# Patient Record
Sex: FEMALE | Race: WHITE
[De-identification: names, ages, dates, MRNs, and addresses within clinical notes are randomized per-mention and may not be internally consistent; named-entity substitution may affect disease eponyms.]

---

## 2017-01-18 ENCOUNTER — HOSPITAL ENCOUNTER (EMERGENCY)
Dept: HOSPITAL 17 - PHED | Age: 77
Discharge: HOME | End: 2017-01-18
Payer: MEDICARE

## 2017-01-18 VITALS
HEART RATE: 78 BPM | OXYGEN SATURATION: 96 % | SYSTOLIC BLOOD PRESSURE: 157 MMHG | RESPIRATION RATE: 19 BRPM | TEMPERATURE: 98 F | DIASTOLIC BLOOD PRESSURE: 75 MMHG

## 2017-01-18 VITALS — BODY MASS INDEX: 23.32 KG/M2 | WEIGHT: 139.99 LBS | HEIGHT: 65 IN

## 2017-01-18 DIAGNOSIS — R21: Primary | ICD-10-CM

## 2017-01-18 DIAGNOSIS — I25.2: ICD-10-CM

## 2017-01-18 DIAGNOSIS — E78.00: ICD-10-CM

## 2017-01-18 DIAGNOSIS — F41.9: ICD-10-CM

## 2017-01-18 DIAGNOSIS — Z86.73: ICD-10-CM

## 2017-01-18 PROCEDURE — 99282 EMERGENCY DEPT VISIT SF MDM: CPT

## 2017-01-18 NOTE — PD
HPI


Chief Complaint:  Skin Problem


Time Seen by Provider:  08:40


Travel History


International Travel<30 days:  No


Contact w/Intl Traveler<30days:  No


Traveled to known affect area:  No





History of Present Illness


HPI


The patient was seen and examined in the presence of the nurse.  She complains 

of rash.  She's had a red rash on her arms primarily but also a bit on her legs 

for the last 2 months.  She seen her primary physician and her dermatologist 

for this and the rash is still present so she came to the ER.  She has some 

minor itching.  She's been placing hydrocortisone cream on it.  Her 

dermatologist told her it came from her pet





PFSH


Past Medical History


Hx Anticoagulant Therapy:  No


Anxiety:  Yes


Cardiovascular Problems:  Yes (2 MI'S)


High Cholesterol:  Yes


Cerebrovascular Accident:  Yes (CVA)


Diabetes:  No


Diminished Hearing:  No


Immunizations Current:  Yes


Menopausal:  Yes


Dilation and Curettage (D&C):  Yes





Past Surgical History


Hysterectomy:  Yes (Partial 1980.)


Tonsillectomy:  Yes


Other Surgery:  Yes (ANAL FISSURE)





Social History


Alcohol Use:  No


Tobacco Use:  No


Substance Use:  No





Allergies-Medications


(Allergen,Severity, Reaction):  


Coded Allergies:  


     Sulfa (Verified  Allergy, Severe, WAS A CHILD WHEN HAPPENED AND NOT SURE 

WHAT HAPPENED, 1/18/17)


Reported Meds & Prescriptions





Reported Meds & Active Scripts


Active


No Active Prescriptions or Reported Medications    








Review of Systems


General / Constitutional:  No: Fever


HENT:  No: Headaches


Cardiovascular:  No: Chest Pain or Discomfort





Physical Exam


Narrative


Psych: Normal mood and affect.  Normal insight and judgment.





SKIN: Inspection shows no ulcers.  Palpation shows no induration or nodules.  

She has macular erythema on the forearms and a bit on the shins.  No vesicle or 

raised lesions.





Data


Data


Last Documented VS





Vital Signs








  Date Time  Temp Pulse Resp B/P Pulse Ox O2 Delivery O2 Flow Rate FiO2


 


1/18/17 08:26 98.0 78 19 157/75 96   











MDM


Medical Decision Making


Medical Screen Exam Complete:  Yes


Emergency Medical Condition:  Yes


Medical Record Reviewed:  Yes


Differential Diagnosis


Allergic reaction, dermatitis, eczema


Narrative Course


I have reviewed the patient's electronic record





Recommending patient follow-up with her dermatologist who has already seen this 

rash and instituted treatment.


No emergent recommendations beyond that





Diagnosis





 Primary Impression:  


 Rash





***Additional Instructions:


Follow-up with dermatologist


***Med/Other Pt SpecificInfo:  Other


Scripts


No Active Prescriptions or Reported Meds


Disposition:  01 DISCHARGE HOME


Condition:  Rodger Pollard MD Jan 18, 2017 08:52

## 2017-04-23 ENCOUNTER — HOSPITAL ENCOUNTER (EMERGENCY)
Dept: HOSPITAL 17 - PHED | Age: 77
Discharge: HOME | End: 2017-04-23
Payer: MEDICARE

## 2017-04-23 VITALS
DIASTOLIC BLOOD PRESSURE: 50 MMHG | RESPIRATION RATE: 18 BRPM | SYSTOLIC BLOOD PRESSURE: 139 MMHG | OXYGEN SATURATION: 99 % | HEART RATE: 76 BPM

## 2017-04-23 VITALS
SYSTOLIC BLOOD PRESSURE: 181 MMHG | TEMPERATURE: 97.8 F | DIASTOLIC BLOOD PRESSURE: 91 MMHG | OXYGEN SATURATION: 93 % | HEART RATE: 80 BPM | RESPIRATION RATE: 14 BRPM

## 2017-04-23 DIAGNOSIS — R21: ICD-10-CM

## 2017-04-23 DIAGNOSIS — I25.2: ICD-10-CM

## 2017-04-23 DIAGNOSIS — Z86.73: ICD-10-CM

## 2017-04-23 DIAGNOSIS — E11.9: ICD-10-CM

## 2017-04-23 DIAGNOSIS — E78.00: ICD-10-CM

## 2017-04-23 DIAGNOSIS — E78.5: ICD-10-CM

## 2017-04-23 DIAGNOSIS — N39.0: Primary | ICD-10-CM

## 2017-04-23 LAB
ALP SERPL-CCNC: 185 U/L (ref 45–117)
ALT SERPL-CCNC: 28 U/L (ref 10–53)
ANION GAP SERPL CALC-SCNC: 9 MEQ/L (ref 5–15)
AST SERPL-CCNC: 19 U/L (ref 15–37)
BASOPHILS # BLD AUTO: 0.1 TH/MM3 (ref 0–0.2)
BASOPHILS NFR BLD: 0.7 % (ref 0–2)
BILIRUB SERPL-MCNC: 0.2 MG/DL (ref 0.2–1)
BUN SERPL-MCNC: 22 MG/DL (ref 7–18)
CHLORIDE SERPL-SCNC: 105 MEQ/L (ref 98–107)
COLOR UR: YELLOW
COMMENT (UR): (no result)
CULTURE IF INDICATED: (no result)
EOSINOPHIL # BLD: 0.2 TH/MM3 (ref 0–0.4)
EOSINOPHIL NFR BLD: 2 % (ref 0–4)
ERYTHROCYTE [DISTWIDTH] IN BLOOD BY AUTOMATED COUNT: 12.2 % (ref 11.6–17.2)
GFR SERPLBLD BASED ON 1.73 SQ M-ARVRAT: 44 ML/MIN (ref 89–?)
GLUCOSE UR STRIP-MCNC: (no result) MG/DL
HCO3 BLD-SCNC: 27.4 MEQ/L (ref 21–32)
HCT VFR BLD CALC: 39.3 % (ref 35–46)
HEMO FLAGS: (no result)
HGB UR QL STRIP: (no result)
HYALINE CASTS #/AREA URNS LPF: (no result) /LPF
KETONES UR STRIP-MCNC: (no result) MG/DL
LEUKOCYTE ESTERASE UR QL STRIP: (no result) /HPF (ref 0–5)
LYMPHOCYTES # BLD AUTO: 1.9 TH/MM3 (ref 1–4.8)
LYMPHOCYTES NFR BLD AUTO: 24 % (ref 9–44)
MCH RBC QN AUTO: 28.9 PG (ref 27–34)
MCHC RBC AUTO-ENTMCNC: 33.8 % (ref 32–36)
MCV RBC AUTO: 85.5 FL (ref 80–100)
METHOD OF COLLECTION: (no result)
MONOCYTES NFR BLD: 7.6 % (ref 0–8)
NEUTROPHILS # BLD AUTO: 5.3 TH/MM3 (ref 1.8–7.7)
NEUTROPHILS NFR BLD AUTO: 65.7 % (ref 16–70)
NITRITE UR QL STRIP: (no result)
PLATELET # BLD: 240 TH/MM3 (ref 150–450)
POTASSIUM SERPL-SCNC: 3.6 MEQ/L (ref 3.5–5.1)
RBC # BLD AUTO: 4.6 MIL/MM3 (ref 4–5.3)
SODIUM SERPL-SCNC: 141 MEQ/L (ref 136–145)
SP GR UR STRIP: 1.02 (ref 1–1.03)
SQUAMOUS #/AREA URNS HPF: (no result) /HPF (ref 0–5)
WBC # BLD AUTO: 8.1 TH/MM3 (ref 4–11)

## 2017-04-23 PROCEDURE — 87086 URINE CULTURE/COLONY COUNT: CPT

## 2017-04-23 PROCEDURE — 83605 ASSAY OF LACTIC ACID: CPT

## 2017-04-23 PROCEDURE — 87040 BLOOD CULTURE FOR BACTERIA: CPT

## 2017-04-23 PROCEDURE — 87186 SC STD MICRODIL/AGAR DIL: CPT

## 2017-04-23 PROCEDURE — 86403 PARTICLE AGGLUT ANTBDY SCRN: CPT

## 2017-04-23 PROCEDURE — 70450 CT HEAD/BRAIN W/O DYE: CPT

## 2017-04-23 PROCEDURE — 85025 COMPLETE CBC W/AUTO DIFF WBC: CPT

## 2017-04-23 PROCEDURE — 87077 CULTURE AEROBIC IDENTIFY: CPT

## 2017-04-23 PROCEDURE — 81001 URINALYSIS AUTO W/SCOPE: CPT

## 2017-04-23 PROCEDURE — 80053 COMPREHEN METABOLIC PANEL: CPT

## 2017-04-23 PROCEDURE — 82140 ASSAY OF AMMONIA: CPT

## 2017-04-23 NOTE — RADHPO
EXAM DATE/TIME:  04/23/2017 15:17 

 

[facility] COMPARISON:     

CT BRAIN W/O CONTRAST, July 05, 2016, 22:28.

 

 

INDICATIONS :     

Altered mental status.

                      

 

RADIATION DOSE:     

59.48 CTDIvol (mGy) 

 

 

 

MEDICAL HISTORY :     

Myocardial infarction. Cerebrovascular disease.  

 

SURGICAL HISTORY :      

Tonsillectomy. 

 

ENCOUNTER:      

Initial

 

ACUITY:      

1 day

 

PAIN SCALE:      

0/10

 

LOCATION:        

cranial 

 

TECHNIQUE:     

Multiple contiguous axial images were obtained of the head.  Using automated exposure control and adj
ustment of the mA and/or kV according to patient size, radiation dose was kept as low as reasonably a
chievable to obtain optimal diagnostic quality images. 

 

FINDINGS:     

 

CEREBRUM:     

The ventricles are normal for age.  No evidence of midline shift, mass lesion, hemorrhage or acute in
farction.  No extra-axial fluid collections are seen.

 

POSTERIOR FOSSA:     

The cerebellum and brainstem are intact.  The 4th ventricle is midline.  The cerebellopontine angle i
s unremarkable.

 

EXTRACRANIAL:     

The visualized portion of the orbits is intact.

 

SKULL:     

The calvaria is intact.  No evidence of skull fracture.

 

CONCLUSION:     

Age-appropriate atrophy.  No acute findings.

 

 

 

 Jong Sullivan MD on April 23, 2017 at 15:30           

Board Certified Radiologist.

 This report was verified electronically.

## 2017-04-23 NOTE — PD
HPI


Chief Complaint:  Altered Mental Status


Time Seen by Provider:  15:03


Travel History


International Travel<30 days:  No


Contact w/Intl Traveler<30days:  No





History of Present Illness


HPI


Patient presents accompanied by her daughter-in-law.  Patient states that she 

doesn't think she is given a make it through the night.  States things are 

shutting down.  States her speech has been shutting down since yesterday.  

Denies any nausea vomiting diarrhea or fever.  Denies any new chest pain 

shortness of breath urinary or bowel symptoms.  Reports a rash that feels like 

it's on fire.  Evaluated twice here in the emergency room and by dermatology 

and diagnosed as a contact dermatitis secondary to her pet.  She does live at 

home alone.  Significant workup for altered mental status approximately 10 

months ago that was negative.  Denies any medication changes.  Past medical 

history for vertigo, diabetes, hyperlipidemia and anxiety.  Currently on no 

medications.  Random blood sugar 191.





PFSH


Past Medical History


Hx Anticoagulant Therapy:  No


Anxiety:  Yes


Cardiovascular Problems:  Yes (2 MI'S)


High Cholesterol:  Yes


Cerebrovascular Accident:  Yes (CVA)


Diabetes:  No (diet control)


Diminished Hearing:  No


Immunizations Current:  Yes


Menopausal:  Yes


Dilation and Curettage (D&C):  Yes





Past Surgical History


Hysterectomy:  Yes (Partial 1980.)


Tonsillectomy:  Yes


Other Surgery:  Yes (ANAL FISSURE)





Social History


Alcohol Use:  No


Tobacco Use:  No


Substance Use:  No





Allergies-Medications


(Allergen,Severity, Reaction):  


Coded Allergies:  


     Sulfa (Verified  Allergy, Severe, WAS A CHILD WHEN HAPPENED AND NOT SURE 

WHAT HAPPENED, 4/23/17)


Reported Meds & Prescriptions





Reported Meds & Active Scripts


Active


No Active Prescriptions or Reported Medications    








Review of Systems


General / Constitutional:  No: Fever


Eyes:  No: Visual changes


HENT:  No: Headaches


Cardiovascular:  No: Chest Pain or Discomfort


Respiratory:  No: Shortness of Breath


Gastrointestinal:  No: Abdominal Pain


Genitourinary:  No: Dysuria


Musculoskeletal:  No: Pain


Skin:  No Rash


Neurologic:  No: Weakness


Psychiatric:  No: Depression


Endocrine:  No: Polydipsia


Hematologic/Lymphatic:  No: Easy Bruising





Physical Exam


Narrative


GENERAL: Well-nourished, well-developed patient.


SKIN: Focused skin assessment warm/dry.


HEAD: Normocephalic.


EYES: No scleral icterus. No injection or drainage. 


NECK: Supple, trachea midline. No JVD or lymphadenopathy.


CARDIOVASCULAR: Regular rate and rhythm without murmurs, gallops, or rubs. 


RESPIRATORY: Breath sounds equal bilaterally. No accessory muscle use.


GASTROINTESTINAL: Abdomen soft, non-tender, nondistended. 


MUSCULOSKELETAL: No cyanosis, or edema. 


BACK: Nontender without obvious deformity. No CVA tenderness.





Data


Data


Last Documented VS





Vital Signs








  Date Time  Temp Pulse Resp B/P Pulse Ox O2 Delivery O2 Flow Rate FiO2


 


4/23/17 15:16     93 Room Air  


 


4/23/17 15:16 97.8 80 14 181/91    








Orders





 Ammonia (4/23/17 15:03)


Complete Blood Count With Diff (4/23/17 15:03)


Comprehensive Metabolic Panel (4/23/17 15:03)


Lactic Acid Sepsis Protocol (4/23/17 15:03)


Urinalysis - C+S If Indicated (4/23/17 15:03)


Blood Culture (4/23/17 15:03)


Ct Brain W/O Iv Contrast(Rout) (4/23/17 15:03)


Blood Glucose (4/23/17 15:03)


Ecg Monitoring (4/23/17 15:03)


Iv Access Insert/Monitor (4/23/17 15:03)


Oximetry (4/23/17 15:03)


Sodium Chloride 0.9% Flush (Ns Flush) (4/23/17 15:15)





Labs





 Laboratory Tests








Test 4/23/17





 15:10


 


White Blood Count 8.1 TH/MM3


 


Red Blood Count 4.60 MIL/MM3


 


Hemoglobin 13.3 GM/DL


 


Hematocrit 39.3 %


 


Mean Corpuscular Volume 85.5 FL


 


Mean Corpuscular Hemoglobin 28.9 PG


 


Mean Corpuscular Hemoglobin 33.8 %





Concent 


 


Red Cell Distribution Width 12.2 %


 


Platelet Count 240 TH/MM3


 


Mean Platelet Volume 7.0 FL


 


Neutrophils (%) (Auto) 65.7 %


 


Lymphocytes (%) (Auto) 24.0 %


 


Monocytes (%) (Auto) 7.6 %


 


Eosinophils (%) (Auto) 2.0 %


 


Basophils (%) (Auto) 0.7 %


 


Neutrophils # (Auto) 5.3 TH/MM3


 


Lymphocytes # (Auto) 1.9 TH/MM3


 


Monocytes # (Auto) 0.6 TH/MM3


 


Eosinophils # (Auto) 0.2 TH/MM3


 


Basophils # (Auto) 0.1 TH/MM3


 


CBC Comment DIFF FINAL 


 


Differential Comment  


 


Sodium Level 141 MEQ/L


 


Potassium Level 3.6 MEQ/L


 


Chloride Level 105 MEQ/L


 


Carbon Dioxide Level 27.4 MEQ/L


 


Anion Gap 9 MEQ/L


 


Blood Urea Nitrogen 22 MG/DL


 


Creatinine 1.20 MG/DL


 


Estimat Glomerular Filtration 44 ML/MIN





Rate 


 


Random Glucose 169 MG/DL


 


Lactic Acid Level 1.1 mmol/L


 


Calcium Level 8.8 MG/DL


 


Total Bilirubin 0.2 MG/DL


 


Aspartate Amino Transf 19 U/L





(AST/SGOT) 


 


Alanine Aminotransferase 28 U/L





(ALT/SGPT) 


 


Alkaline Phosphatase 185 U/L


 


Ammonia 23 MCMOL/L


 


Total Protein 7.5 GM/DL


 


Albumin 3.7 GM/DL











MDM


Medical Decision Making


Medical Screen Exam Complete:  Yes


Emergency Medical Condition:  Yes


Differential Diagnosis


Failure to thrive, dementia, UTI, sepsis, fever, contact dermatitis


Narrative Course


Assessment and plan discussed with patient and daughter-in-law at bedside





[facility] Point of Care


Comment


Case discussed and care transferred to Dr Villafana


Scripts


No Active Prescriptions or Reported Meds








Ezio Redding MD Apr 23, 2017 15:09

## 2017-04-23 NOTE — PD
Physical Exam


Date Seen by Provider:  Apr 23, 2017


Time Seen by Provider:  16:26


Narrative


This 76-year-old female is complaining of feeling very tired.  She was seen 

initially by Dr. Redding.  She is currently on no medication.  She has not had 

fever or chills.  Extensive workup was ordered.  CT of the brain is negative.  

Her blood work is unremarkable.  Urinalysis does show equivocal evidence of UTI 

with trace leukocyte esterase and 6-8 white cells.  She will be treated for 

this as she is having somewhat vague symptoms that are not explained otherwise.





Data


Data


Last Documented VS





Vital Signs








  Date Time  Temp Pulse Resp B/P Pulse Ox O2 Delivery O2 Flow Rate FiO2


 


4/23/17 16:08  76 18 139/50 99 Room Air  


 


4/23/17 15:16 97.8       








Orders





 Ammonia (4/23/17 15:03)


Complete Blood Count With Diff (4/23/17 15:03)


Comprehensive Metabolic Panel (4/23/17 15:03)


Lactic Acid Sepsis Protocol (4/23/17 15:03)


Urinalysis - C+S If Indicated (4/23/17 15:03)


Blood Culture (4/23/17 15:03)


Ct Brain W/O Iv Contrast(Rout) (4/23/17 15:03)


Blood Glucose (4/23/17 15:03)


Ecg Monitoring (4/23/17 15:03)


Iv Access Insert/Monitor (4/23/17 15:03)


Oximetry (4/23/17 15:03)


Sodium Chloride 0.9% Flush (Ns Flush) (4/23/17 15:15)


Urine Culture (4/23/17 15:55)





Labs





 Laboratory Tests








Test 4/23/17 4/23/17





 15:10 15:55


 


White Blood Count 8.1 TH/MM3 


 


Red Blood Count 4.60 MIL/MM3 


 


Hemoglobin 13.3 GM/DL 


 


Hematocrit 39.3 % 


 


Mean Corpuscular Volume 85.5 FL 


 


Mean Corpuscular Hemoglobin 28.9 PG 


 


Mean Corpuscular Hemoglobin 33.8 % 





Concent  


 


Red Cell Distribution Width 12.2 % 


 


Platelet Count 240 TH/MM3 


 


Mean Platelet Volume 7.0 FL 


 


Neutrophils (%) (Auto) 65.7 % 


 


Lymphocytes (%) (Auto) 24.0 % 


 


Monocytes (%) (Auto) 7.6 % 


 


Eosinophils (%) (Auto) 2.0 % 


 


Basophils (%) (Auto) 0.7 % 


 


Neutrophils # (Auto) 5.3 TH/MM3 


 


Lymphocytes # (Auto) 1.9 TH/MM3 


 


Monocytes # (Auto) 0.6 TH/MM3 


 


Eosinophils # (Auto) 0.2 TH/MM3 


 


Basophils # (Auto) 0.1 TH/MM3 


 


CBC Comment DIFF FINAL  


 


Differential Comment   


 


Sodium Level 141 MEQ/L 


 


Potassium Level 3.6 MEQ/L 


 


Chloride Level 105 MEQ/L 


 


Carbon Dioxide Level 27.4 MEQ/L 


 


Anion Gap 9 MEQ/L 


 


Blood Urea Nitrogen 22 MG/DL 


 


Creatinine 1.20 MG/DL 


 


Estimat Glomerular Filtration 44 ML/MIN 





Rate  


 


Random Glucose 169 MG/DL 


 


Lactic Acid Level 1.1 mmol/L 


 


Calcium Level 8.8 MG/DL 


 


Total Bilirubin 0.2 MG/DL 


 


Aspartate Amino Transf 19 U/L 





(AST/SGOT)  


 


Alanine Aminotransferase 28 U/L 





(ALT/SGPT)  


 


Alkaline Phosphatase 185 U/L 


 


Ammonia 23 MCMOL/L 


 


Total Protein 7.5 GM/DL 


 


Albumin 3.7 GM/DL 


 


Urine Collection Type  CATH 


 


Urine Color  YELLOW 


 


Urine Turbidity  CLEAR 


 


Urine pH  5.0 


 


Urine Specific Gravity  1.023 


 


Urine Protein  NEG mg/dL


 


Urine Glucose (UA)  NEG mg/dL


 


Urine Ketones  NEG mg/dL


 


Urine Occult Blood  NEG 


 


Urine Nitrite  NEG 


 


Urine Bilirubin  NEG 


 


Urine Leukocyte Esterase  SMALL 


 


Urine WBC  6-8 /hpf


 


Urine Squamous Epithelial  0-5 /hpf





Cells  


 


Urine Amorphous Sediment  FEW 


 


Urine Hyaline Casts  0-2 /lpf


 


Microscopic Urinalysis Comment  CULTURE





  INDICATED











MDM


Medical Record Reviewed:  Yes


Supervised Visit with REZA:  No


Differential Diagnosis


Differential includes alleged right imbalance, UTI,


Narrative Course


Urine shows equivocal  UTI


Diagnosis





 Primary Impression:  


 UTI (urinary tract infection)


 Qualified Code:  N30.00 - Acute cystitis without hematuria


Scripts


Nitrofurantoin Monohydrate Macrocrystals (Macrobid)100 Mg Eyr504 Mg PO BID  7 

Days  Ref 0


   Prov:Zafar Jose MD         4/23/17


Disposition:  01 DISCHARGE HOME


Condition:  Stable








Zafar Jose MD Apr 23, 2017 16:38

## 2017-05-06 ENCOUNTER — HOSPITAL ENCOUNTER (EMERGENCY)
Dept: HOSPITAL 17 - PHED | Age: 77
Discharge: HOME | End: 2017-05-06
Payer: MEDICARE

## 2017-05-06 VITALS — DIASTOLIC BLOOD PRESSURE: 75 MMHG | SYSTOLIC BLOOD PRESSURE: 171 MMHG

## 2017-05-06 VITALS
SYSTOLIC BLOOD PRESSURE: 191 MMHG | OXYGEN SATURATION: 98 % | HEART RATE: 78 BPM | RESPIRATION RATE: 20 BRPM | DIASTOLIC BLOOD PRESSURE: 88 MMHG

## 2017-05-06 VITALS — BODY MASS INDEX: 23.14 KG/M2 | HEIGHT: 65 IN | WEIGHT: 138.89 LBS

## 2017-05-06 VITALS
OXYGEN SATURATION: 96 % | TEMPERATURE: 98 F | DIASTOLIC BLOOD PRESSURE: 90 MMHG | SYSTOLIC BLOOD PRESSURE: 159 MMHG | HEART RATE: 72 BPM | RESPIRATION RATE: 16 BRPM

## 2017-05-06 DIAGNOSIS — Z86.73: ICD-10-CM

## 2017-05-06 DIAGNOSIS — F41.9: ICD-10-CM

## 2017-05-06 DIAGNOSIS — I25.2: ICD-10-CM

## 2017-05-06 DIAGNOSIS — E78.00: ICD-10-CM

## 2017-05-06 DIAGNOSIS — R53.1: Primary | ICD-10-CM

## 2017-05-06 LAB
ALP SERPL-CCNC: 171 U/L (ref 45–117)
ALT SERPL-CCNC: 28 U/L (ref 10–53)
ANION GAP SERPL CALC-SCNC: 9 MEQ/L (ref 5–15)
AST SERPL-CCNC: 22 U/L (ref 15–37)
BACTERIA #/AREA URNS HPF: (no result) /HPF
BASOPHILS # BLD AUTO: 0.4 TH/MM3 (ref 0–0.2)
BASOPHILS NFR BLD: 4 % (ref 0–2)
BILIRUB SERPL-MCNC: 0.2 MG/DL (ref 0.2–1)
BUN SERPL-MCNC: 19 MG/DL (ref 7–18)
CHLORIDE SERPL-SCNC: 105 MEQ/L (ref 98–107)
COLOR UR: YELLOW
COMMENT (UR): (no result)
CULTURE IF INDICATED: (no result)
EOSINOPHIL # BLD: 0.3 TH/MM3 (ref 0–0.4)
EOSINOPHIL NFR BLD: 2.9 % (ref 0–4)
ERYTHROCYTE [DISTWIDTH] IN BLOOD BY AUTOMATED COUNT: 12.1 % (ref 11.6–17.2)
GFR SERPLBLD BASED ON 1.73 SQ M-ARVRAT: 56 ML/MIN (ref 89–?)
GLUCOSE UR STRIP-MCNC: (no result) MG/DL
HCO3 BLD-SCNC: 27.5 MEQ/L (ref 21–32)
HCT VFR BLD CALC: 37 % (ref 35–46)
HEMO FLAGS: (no result)
HGB UR QL STRIP: (no result)
KETONES UR STRIP-MCNC: (no result) MG/DL
LEUKOCYTE ESTERASE UR QL STRIP: (no result) /HPF (ref 0–5)
LYMPHOCYTES # BLD AUTO: 2.1 TH/MM3 (ref 1–4.8)
LYMPHOCYTES NFR BLD AUTO: 21.9 % (ref 9–44)
MCH RBC QN AUTO: 28.8 PG (ref 27–34)
MCHC RBC AUTO-ENTMCNC: 33.6 % (ref 32–36)
MCV RBC AUTO: 85.7 FL (ref 80–100)
MONOCYTES NFR BLD: 6.5 % (ref 0–8)
NEUTROPHILS # BLD AUTO: 6.3 TH/MM3 (ref 1.8–7.7)
NEUTROPHILS NFR BLD AUTO: 64.7 % (ref 16–70)
NITRITE UR QL STRIP: (no result)
PLATELET # BLD: 246 TH/MM3 (ref 150–450)
POTASSIUM SERPL-SCNC: 4 MEQ/L (ref 3.5–5.1)
RBC # BLD AUTO: 4.31 MIL/MM3 (ref 4–5.3)
RBC #/AREA URNS HPF: (no result) /HPF (ref 0–3)
SODIUM SERPL-SCNC: 141 MEQ/L (ref 136–145)
SP GR UR STRIP: 1.01 (ref 1–1.03)
SQUAMOUS #/AREA URNS HPF: (no result) /HPF (ref 0–5)
WBC # BLD AUTO: 9.7 TH/MM3 (ref 4–11)

## 2017-05-06 PROCEDURE — 84443 ASSAY THYROID STIM HORMONE: CPT

## 2017-05-06 PROCEDURE — 93005 ELECTROCARDIOGRAM TRACING: CPT

## 2017-05-06 PROCEDURE — 85025 COMPLETE CBC W/AUTO DIFF WBC: CPT

## 2017-05-06 PROCEDURE — 84484 ASSAY OF TROPONIN QUANT: CPT

## 2017-05-06 PROCEDURE — 80053 COMPREHEN METABOLIC PANEL: CPT

## 2017-05-06 PROCEDURE — 71020: CPT

## 2017-05-06 PROCEDURE — 81001 URINALYSIS AUTO W/SCOPE: CPT

## 2017-05-06 NOTE — EKG
Date Performed: 05/06/2017       Time Performed: 17:44:52

 

PTAGE:      76 years

 

EKG:      Sinus rhythm 

 

 Poor R wave progression - cannot rule out anteroseptal infarct Left ventricular hypertrophy Abnormal
 ECG

 

PREVIOUS TRACING       : 07/05/2016 21.55

 

DOCTOR:   Shanta Sanchez  Interpretating Date/Time  05/06/2017 21:35:58

## 2017-05-06 NOTE — PD
HPI


Chief Complaint:  General Weakness


Time Seen by Provider:  17:25


Travel History


International Travel<30 days:  No


Contact w/Intl Traveler<30days:  No


Traveled to known affect area:  No





History of Present Illness


HPI


Patient is a 76-year-old female who comes in with complaints of generalized 

weakness.  She says that she was at home and she fell asleep and she didn't 

take of breath so she woke up.  Patient was here about a week ago when she said 

that she felt like she "was not going to make it through the night."  At that 

time a complete workup was done and she was found to have some white blood 

cells in her urine.  She was discharged her Macrobid, which she says she has 

completed.  She has no specific complaints at this time.  She denies any pain.  

She denies any chest pain, shortness of breath, abdominal pain.  She has not 

had fever or chills.  She had breakfast with her son this morning.  She says 

that she has a lot of anxiety.





PFSH


Past Medical History


Hx Anticoagulant Therapy:  No


Anxiety:  Yes


Cardiovascular Problems:  Yes (2 MI'S)


High Cholesterol:  Yes


Cerebrovascular Accident:  Yes (CVA)


Diabetes:  Yes (diet control)


Patient Takes Glucophage:  No


Diminished Hearing:  No


Immunizations Current:  Yes


Pregnant?:  Not Pregnant


Menopausal:  Yes


Dilation and Curettage (D&C):  Yes





Past Surgical History


Hysterectomy:  Yes (Partial 1980.)


Tonsillectomy:  Yes


Other Surgery:  Yes (ANAL FISSURE)





Social History


Alcohol Use:  No


Tobacco Use:  No


Substance Use:  No





Allergies-Medications


(Allergen,Severity, Reaction):  


Coded Allergies:  


     Sulfa (Verified  Allergy, Severe, WAS A CHILD WHEN HAPPENED AND NOT SURE 

WHAT HAPPENED, 5/6/17)


Reported Meds & Prescriptions





Reported Meds & Active Scripts


Active


No Active Prescriptions or Reported Medications    








Review of Systems


Except as stated in HPI:  all other systems reviewed are Neg


General / Constitutional:  No: Fever, Chills


HENT:  No: Headaches, Lightheadedness


Cardiovascular:  No: Chest Pain or Discomfort


Respiratory:  No: Shortness of Breath


Gastrointestinal:  No: Nausea, Vomiting


Genitourinary:  No: Dysuria


Musculoskeletal:  No: Myalgias


Skin:  No Rash, No Change in Pigmentation


Neurologic:  No: Weakness, Dizziness


Psychiatric:  Positive: Anxiety





Physical Exam


Narrative


GENERAL: Awake and alert, in no acute distress.


SKIN: Focused skin assessment warm/dry.


HEAD: Atraumatic. Normocephalic. 


EYES: Pupils equal and round. No scleral icterus. 


ENT: Mucous membranes pink and moist.


NECK: Trachea midline. No JVD. 


CARDIOVASCULAR: Regular rate and rhythm.  No murmur appreciated.


RESPIRATORY: No accessory muscle use. Clear to auscultation. Breath sounds 

equal bilaterally. 


GASTROINTESTINAL: Abdomen soft, non-tender, nondistended. 


MUSCULOSKELETAL: No obvious deformities. No clubbing.  No cyanosis.  No edema. 


NEUROLOGICAL: Awake and alert. No obvious cranial nerve deficits.  Motor 

grossly within normal limits. Normal speech.


PSYCHIATRIC: Appropriate mood and affect; insight and judgment normal.





Data


Data


Last Documented VS





Vital Signs








  Date Time  Temp Pulse Resp B/P Pulse Ox O2 Delivery O2 Flow Rate FiO2


 


5/6/17 17:06 98.0 72 16 159/90 96   








Orders





 Complete Blood Count With Diff (5/6/17 17:42)


Comprehensive Metabolic Panel (5/6/17 17:42)


Thyroid Stimulating Hormone (5/6/17 17:42)


Electrocardiogram (5/6/17 )


Urinalysis - C+S If Indicated (5/6/17 17:42)


Troponin I (5/6/17 17:42)


Chest, Pa & Lat (5/6/17 )





Labs





 Laboratory Tests








Test 5/6/17 5/6/17





 18:15 19:00


 


White Blood Count 9.7 TH/MM3 


 


Red Blood Count 4.31 MIL/MM3 


 


Hemoglobin 12.4 GM/DL 


 


Hematocrit 37.0 % 


 


Mean Corpuscular Volume 85.7 FL 


 


Mean Corpuscular Hemoglobin 28.8 PG 


 


Mean Corpuscular Hemoglobin 33.6 % 





Concent  


 


Red Cell Distribution Width 12.1 % 


 


Platelet Count 246 TH/MM3 


 


Mean Platelet Volume 7.2 FL 


 


Neutrophils (%) (Auto) 64.7 % 


 


Lymphocytes (%) (Auto) 21.9 % 


 


Monocytes (%) (Auto) 6.5 % 


 


Eosinophils (%) (Auto) 2.9 % 


 


Basophils (%) (Auto) 4.0 % 


 


Neutrophils # (Auto) 6.3 TH/MM3 


 


Lymphocytes # (Auto) 2.1 TH/MM3 


 


Monocytes # (Auto) 0.6 TH/MM3 


 


Eosinophils # (Auto) 0.3 TH/MM3 


 


Basophils # (Auto) 0.4 TH/MM3 


 


CBC Comment DIFF FINAL  


 


Differential Comment   


 


Sodium Level 141 MEQ/L 


 


Potassium Level 4.0 MEQ/L 


 


Chloride Level 105 MEQ/L 


 


Carbon Dioxide Level 27.5 MEQ/L 


 


Anion Gap 9 MEQ/L 


 


Blood Urea Nitrogen 19 MG/DL 


 


Creatinine 0.97 MG/DL 


 


Estimat Glomerular Filtration 56 ML/MIN 





Rate  


 


Random Glucose 107 MG/DL 


 


Calcium Level 8.7 MG/DL 


 


Total Bilirubin 0.2 MG/DL 


 


Aspartate Amino Transf 22 U/L 





(AST/SGOT)  


 


Alanine Aminotransferase 28 U/L 





(ALT/SGPT)  


 


Alkaline Phosphatase 171 U/L 


 


Troponin I LESS THAN 0.02 





 NG/ML 


 


Total Protein 7.2 GM/DL 


 


Albumin 3.5 GM/DL 


 


Thyroid Stimulating Hormone 2.260 uIU/ML 





3rd Gen  


 


Urine Color  YELLOW 


 


Urine Turbidity  CLEAR 


 


Urine pH  5.5 


 


Urine Specific Gravity  1.009 


 


Urine Protein  NEG mg/dL


 


Urine Glucose (UA)  NEG mg/dL


 


Urine Ketones  NEG mg/dL


 


Urine Occult Blood  NEG 


 


Urine Nitrite  NEG 


 


Urine Bilirubin  NEG 


 


Urine Leukocyte Esterase  SMALL 


 


Urine RBC  0-2 /hpf


 


Urine WBC  6-8 /hpf


 


Urine Squamous Epithelial  0-5 /hpf





Cells  


 


Urine Bacteria  FEW /hpf


 


Microscopic Urinalysis Comment  CULT NOT





  INDICATED











MDM


Medical Decision Making


Medical Screen Exam Complete:  Yes


Emergency Medical Condition:  Yes


Medical Record Reviewed:  Yes


Differential Diagnosis


Anxiety versus electrolyte abnormality versus infection


Narrative Course


Patient is a 76-year-old female comes in with vague complaints of weakness.  

Exam shows no acute abnormalities.  IV established, labs sent.  Labs show no 

acute abnormalities, are similar to her labs when she was here a week and half 

ago.





I spoke with her son and daughter who say that she seems to very anxious when 

her male friend is not spending time with her.  They have noticed that she 

seems to do things in seeking attention.  She has 4 children who live in the 

area and help her.  They say that her house is clean though she does have a lot 

of objects in the house.  She seems to be feeding herself.  They are not 

concerned that she is unable to care for self at home.  She is alert and 

oriented times 4.  





I discussed with the son and daughter possible dementia versus anxiety versus 

attention seeking.  They're comfortable taking their mother home at this time.  

I advised him to follow-up with a primary care doctor.  I advised him to bring 

her back at any time for any worsening symptoms.





Diagnosis





 Primary Impression:  


 Weakness


Patient Instructions:  Anxiety (ED), General Instructions, Weakness (ED)





***Additional Instructions:


Follow up with a primary care doctor.  Return to the ED as needed for any 

worsening symptoms.


Scripts


No Active Prescriptions or Reported Meds


Disposition:  01 DISCHARGE HOME


Condition:  Stable








Aym Estrada MD May 6, 2017 19:33

## 2017-10-10 ENCOUNTER — HOSPITAL ENCOUNTER (EMERGENCY)
Dept: HOSPITAL 17 - PHED | Age: 77
Discharge: HOME | End: 2017-10-10
Payer: MEDICARE

## 2017-10-10 VITALS
OXYGEN SATURATION: 97 % | RESPIRATION RATE: 16 BRPM | DIASTOLIC BLOOD PRESSURE: 79 MMHG | SYSTOLIC BLOOD PRESSURE: 140 MMHG | TEMPERATURE: 97.8 F | HEART RATE: 72 BPM

## 2017-10-10 VITALS
OXYGEN SATURATION: 99 % | HEART RATE: 69 BPM | SYSTOLIC BLOOD PRESSURE: 140 MMHG | DIASTOLIC BLOOD PRESSURE: 79 MMHG | RESPIRATION RATE: 16 BRPM

## 2017-10-10 VITALS
SYSTOLIC BLOOD PRESSURE: 148 MMHG | OXYGEN SATURATION: 100 % | HEART RATE: 78 BPM | DIASTOLIC BLOOD PRESSURE: 75 MMHG | RESPIRATION RATE: 16 BRPM

## 2017-10-10 VITALS
DIASTOLIC BLOOD PRESSURE: 55 MMHG | SYSTOLIC BLOOD PRESSURE: 130 MMHG | OXYGEN SATURATION: 100 % | RESPIRATION RATE: 16 BRPM | HEART RATE: 70 BPM

## 2017-10-10 VITALS — HEIGHT: 65 IN | BODY MASS INDEX: 22.41 KG/M2 | WEIGHT: 134.48 LBS

## 2017-10-10 VITALS — OXYGEN SATURATION: 99 % | RESPIRATION RATE: 16 BRPM

## 2017-10-10 DIAGNOSIS — F41.9: ICD-10-CM

## 2017-10-10 DIAGNOSIS — M54.6: Primary | ICD-10-CM

## 2017-10-10 DIAGNOSIS — E78.5: ICD-10-CM

## 2017-10-10 DIAGNOSIS — Z86.73: ICD-10-CM

## 2017-10-10 DIAGNOSIS — E11.9: ICD-10-CM

## 2017-10-10 DIAGNOSIS — I25.2: ICD-10-CM

## 2017-10-10 LAB
ALP SERPL-CCNC: 153 U/L (ref 45–117)
ALT SERPL-CCNC: 26 U/L (ref 10–53)
ANION GAP SERPL CALC-SCNC: 6 MEQ/L (ref 5–15)
AST SERPL-CCNC: 19 U/L (ref 15–37)
BASOPHILS # BLD AUTO: 0 TH/MM3 (ref 0–0.2)
BASOPHILS NFR BLD: 0.4 % (ref 0–2)
BILIRUB SERPL-MCNC: 0.1 MG/DL (ref 0.2–1)
BUN SERPL-MCNC: 23 MG/DL (ref 7–18)
CHLORIDE SERPL-SCNC: 104 MEQ/L (ref 98–107)
EOSINOPHIL # BLD: 0.1 TH/MM3 (ref 0–0.4)
EOSINOPHIL NFR BLD: 1.6 % (ref 0–4)
ERYTHROCYTE [DISTWIDTH] IN BLOOD BY AUTOMATED COUNT: 12.1 % (ref 11.6–17.2)
GFR SERPLBLD BASED ON 1.73 SQ M-ARVRAT: 54 ML/MIN (ref 89–?)
HCO3 BLD-SCNC: 28.8 MEQ/L (ref 21–32)
HCT VFR BLD CALC: 36.7 % (ref 35–46)
HEMO FLAGS: (no result)
LYMPHOCYTES # BLD AUTO: 1.7 TH/MM3 (ref 1–4.8)
LYMPHOCYTES NFR BLD AUTO: 20.3 % (ref 9–44)
MCH RBC QN AUTO: 29.1 PG (ref 27–34)
MCHC RBC AUTO-ENTMCNC: 34.7 % (ref 32–36)
MCV RBC AUTO: 84 FL (ref 80–100)
MONOCYTES NFR BLD: 7.1 % (ref 0–8)
NEUTROPHILS # BLD AUTO: 5.9 TH/MM3 (ref 1.8–7.7)
NEUTROPHILS NFR BLD AUTO: 70.6 % (ref 16–70)
PLATELET # BLD: 202 TH/MM3 (ref 150–450)
POTASSIUM SERPL-SCNC: 4.2 MEQ/L (ref 3.5–5.1)
RBC # BLD AUTO: 4.36 MIL/MM3 (ref 4–5.3)
SODIUM SERPL-SCNC: 139 MEQ/L (ref 136–145)
WBC # BLD AUTO: 8.3 TH/MM3 (ref 4–11)

## 2017-10-10 PROCEDURE — 72128 CT CHEST SPINE W/O DYE: CPT

## 2017-10-10 PROCEDURE — 85025 COMPLETE CBC W/AUTO DIFF WBC: CPT

## 2017-10-10 PROCEDURE — 71010: CPT

## 2017-10-10 PROCEDURE — 84484 ASSAY OF TROPONIN QUANT: CPT

## 2017-10-10 PROCEDURE — 80053 COMPREHEN METABOLIC PANEL: CPT

## 2017-10-10 PROCEDURE — 93005 ELECTROCARDIOGRAM TRACING: CPT

## 2017-10-10 NOTE — EKG
Date Performed: 10/10/2017       Time Performed: 12:04:56

 

PTAGE:      77 years

 

EKG:      Sinus rhythm 

 

 LEFT ANTERIOR FASCICULAR BLOCK MODERATE VOLTAGE CRITERIA FOR LVH, CONSIDER NORMAL VARIANT POSSIBLE A
NTERIOR MYOCARDIAL INFARCTION ABNORMAL ECG INTERPRETATION BASED ON A DEFAULT AGE OF 40 YEARS Compared
 to the 

 

 PREVIOUS TRACING            , no significant change

 

DOCTOR:   Matt Dodd  Interpretating Date/Time  10/10/2017 16:20:15

## 2017-10-10 NOTE — RADRPT
EXAM DATE/TIME:  10/10/2017 12:45 

 

[facility] COMPARISON:     

CHEST SINGLE AP, July 05, 2016, 23:15.

 

                     

INDICATIONS :     

Chest pain.

                     

 

MEDICAL HISTORY :            

Myocardial infarction. Cerebrovascular disease.   

 

SURGICAL HISTORY :     

Tonsillectomy.   

 

ENCOUNTER:     

Initial                                        

 

ACUITY:     

1 day      

 

PAIN SCORE:     

4/10

 

LOCATION:     

Bilateral chest 

 

FINDINGS:     

A single view of the chest demonstrates the lungs to be symmetrically aerated without evidence of mas
s, infiltrate or effusion.  The cardiomediastinal contours are unremarkable.  Osseous structures are 
intact. 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

 

CONCLUSION:     

No acute disease.  No significant change has occurred.  

 

 

 

 Luis Gamble MD on October 10, 2017 at 12:55           

Board Certified Radiologist.

 This report was verified electronically.

## 2017-10-10 NOTE — PD
HPI


Chief Complaint:  Back/ Neck Pain or Injury


Time Seen by Provider:  12:13


Travel History


International Travel<30 days:  No


Contact w/Intl Traveler<30days:  No


Traveled to known affect area:  No





History of Present Illness


HPI


This is a 77-year-old female who presents to the emergency department with 1 

month of intermittent pain between her scapula blades, intermittent, worse when 

she gets anxious or nervous about something, improved with resting.  She says 

this pain started right around the time that her boyfriend left her area ever 

since then she's been getting more and more worried and she's been noticing 

this pain recurs.  She doesn't have a primary care physician and doesn't know 

of any medical problems that she has.  She denies any chest pain or trouble 

breathing.





PFSH


Past Medical History


Hx Anticoagulant Therapy:  No


Anxiety:  Yes


Cardiovascular Problems:  Yes (hx of MI)


High Cholesterol:  Yes


Cerebrovascular Accident:  Yes (CVA)


Diabetes:  Yes (diet control)


Patient Takes Glucophage:  No


Diminished Hearing:  Yes ([place])


Immunizations Current:  Yes


Myocardial Infarction:  Yes (x1 at age 36 ?)


Tetanus Vaccination:  > 5 Years


Influenza Vaccination:  No


Pregnant?:  Not Pregnant


Menopausal:  Yes


Dilation and Curettage (D&C):  Yes





Past Surgical History


Hysterectomy:  Yes (Partial 1980.)


Tonsillectomy:  Yes


Other Surgery:  Yes (ANAL FISSURE)





Social History


Alcohol Use:  No


Tobacco Use:  No


Substance Use:  No





Allergies-Medications


(Allergen,Severity, Reaction):  


Coded Allergies:  


     Sulfa (Sulfonamide Antibiotics) (Unverified  Allergy, Severe, WAS A CHILD 

WHEN HAPPENED AND NOT SURE WHAT HAPPENED, 10/10/17)


Reported Meds & Prescriptions





Reported Meds & Active Scripts


Active


No Active Prescriptions or Reported Medications    








Review of Systems


Except as stated in HPI:  all other systems reviewed are Neg





Physical Exam


Narrative


GENERAL:Well appearing, no acute distress


SKIN: Focused skin assessment warm and dry.


HEAD: Atraumatic. Normocephalic. 


EYES: Pupils equal and round.  No injection or drainage. 


ENT:  Moist mucous membranes


NECK: Trachea midline. 


CARDIOVASCULAR: Regular rate and rhythm.  No murmur appreciated.


RESPIRATORY: Clear to auscultation. Breath sounds equal bilaterally. 


GASTROINTESTINAL: Abdomen soft, non-tender, nondistended. 


MUSCULOSKELETAL: No focal thoracic vertebral tenderness.


NEUROLOGICAL: Awake and alert. No obvious cranial nerve deficits.  Moving all 

extremities.


PSYCHIATRIC: Somewhat anxious.





Data


Data


Last Documented VS





Vital Signs








  Date Time  Temp Pulse Resp B/P (MAP) Pulse Ox O2 Delivery O2 Flow Rate FiO2


 


10/10/17 13:54  67 16     


 


10/10/17 13:15    130/55 (80) 100 Room Air  


 


10/10/17 12:46       2.00 


 


10/10/17 12:00 97.8       








Orders





 Orders


Electrocardiogram (10/10/17 12:25)


Complete Blood Count With Diff (10/10/17 12:25)


Comprehensive Metabolic Panel (10/10/17 12:25)


Troponin I (10/10/17 12:25)


Chest, Single Ap (10/10/17 12:25)


Ecg Monitoring (10/10/17 12:25)


Bilateral Bp Monitoring (10/10/17 12:25)


Iv Access Insert/Monitor (10/10/17 12:25)


Oximetry (10/10/17 12:25)


Oxygen Administration (10/10/17 12:25)


Sodium Chloride 0.9% Flush (Ns Flush) (10/10/17 12:30)


Ct Thor Spine W/O Contrast (10/10/17 )





Labs





Laboratory Tests








Test


  10/10/17


12:35


 


White Blood Count 8.3 TH/MM3 


 


Red Blood Count 4.36 MIL/MM3 


 


Hemoglobin 12.7 GM/DL 


 


Hematocrit 36.7 % 


 


Mean Corpuscular Volume 84.0 FL 


 


Mean Corpuscular Hemoglobin 29.1 PG 


 


Mean Corpuscular Hemoglobin


Concent 34.7 % 


 


 


Red Cell Distribution Width 12.1 % 


 


Platelet Count 202 TH/MM3 


 


Mean Platelet Volume 7.0 FL 


 


Neutrophils (%) (Auto) 70.6 % 


 


Lymphocytes (%) (Auto) 20.3 % 


 


Monocytes (%) (Auto) 7.1 % 


 


Eosinophils (%) (Auto) 1.6 % 


 


Basophils (%) (Auto) 0.4 % 


 


Neutrophils # (Auto) 5.9 TH/MM3 


 


Lymphocytes # (Auto) 1.7 TH/MM3 


 


Monocytes # (Auto) 0.6 TH/MM3 


 


Eosinophils # (Auto) 0.1 TH/MM3 


 


Basophils # (Auto) 0.0 TH/MM3 


 


CBC Comment DIFF FINAL 


 


Differential Comment  


 


Blood Urea Nitrogen 23 MG/DL 


 


Creatinine 1.00 MG/DL 


 


Random Glucose 163 MG/DL 


 


Total Protein 7.1 GM/DL 


 


Albumin 3.5 GM/DL 


 


Calcium Level 8.6 MG/DL 


 


Alkaline Phosphatase 153 U/L 


 


Aspartate Amino Transf


(AST/SGOT) 19 U/L 


 


 


Alanine Aminotransferase


(ALT/SGPT) 26 U/L 


 


 


Total Bilirubin 0.1 MG/DL 


 


Sodium Level 139 MEQ/L 


 


Potassium Level 4.2 MEQ/L 


 


Chloride Level 104 MEQ/L 


 


Carbon Dioxide Level 28.8 MEQ/L 


 


Anion Gap 6 MEQ/L 


 


Estimat Glomerular Filtration


Rate 54 ML/MIN 


 


 


Troponin I


  LESS THAN 0.02


NG/ML











MDM


Medical Decision Making


Medical Screen Exam Complete:  Yes


Emergency Medical Condition:  Yes


Interpretation(s)


Afebrile, no tachycardia, normotensive


No leukocytosis


Electrolytes are reassuring


Troponin is normal


EKG: Q waves in the inferior leads, first-degree heart block





Last 24 hours Impressions








Chest X-Ray 10/10/17 1225 Signed





Impressions: 





 Service Date/Time:  Tuesday, October 10, 2017 12:45 - CONCLUSION:  No acute 





 disease.  No significant change has occurred.       Luis Gamble MD 


 


Thoracic Spine CT 10/10/17 0000 Signed





Impressions: 





 Service Date/Time:  Tuesday, October 10, 2017 13:10 - CONCLUSION:  1. Mild 





 degenerative changes of the thoracic spine. No acute abnormality. 2. COPD 





 changes within the pulmonary parenchyma.     Richi Bustamante MD 








Differential Diagnosis


Acute coronary syndrome, compression fracture, musculoskeletal back pain, 

unstable angina


Narrative Course


This is a 77-year-old female who presents to the emergency department with 1 

month of intermittent thoracic back pain.  She was placed on a monitor and an 

IV was established.  EKG demonstrates some Q waves in the inferior leads.  She 

says she had a heart attack in her 30s that required no intervention and has 

had no problems since.  Labs are reassuring.  CT of the T-spine is reassuring.  

I had along conversation with the patient.  I offered her observation for 

serial cardiac enzymes and stress test.  She has a dog at home that's blind and 

she feels is very dependent on her.  She refuses to stay in the hospital 

overnight for stress test.  She has decision-making capacity on my exam and I 

can't hold her against her will.  She understands that we can't rule out a 

blockage in her heart without further testing.  She thinks that this is likely 

related to the loss of her close friend who moved out a month ago.  I tend to 

agree that I would feel better if she found a primary care physician to further 

evaluate her heart.  She was referred to [place] [facility] and to the 

[facility].





Diagnosis





 Primary Impression:  


 Back pain


 Qualified Codes:  M54.6 - Pain in thoracic spine; G89.29 - Other chronic pain


Referrals:  


Jessica Serna





[facility]


Patient Instructions:  General Instructions





***Additional Instructions:  


If you develop severe chest pain, shortness of breath, sweating, lightheadedness

, dizziness or difficulty breathing return to the emergency department 

immediately.


Followup with your primary care physician as soon as possible.


***Med/Other Pt SpecificInfo:  No Change to Meds


Scripts


No Active Prescriptions or Reported Meds


Disposition:  01 DISCHARGE HOME


Condition:  Stable











Amelia Moreland MD Oct 10, 2017 12:48

## 2017-10-10 NOTE — RADRPT
EXAM DATE/TIME:  10/10/2017 13:10 

 

[facility] COMPARISON:     

CT BRAIN W/O CONTRAST, April 23, 2017, 15:17.

 

 

INDICATIONS :     

Upper back pain. No injury.

                      

 

RADIATION DOSE:     

21.32 CTDIvol (mGy) 

 

 

 

MEDICAL HISTORY :     

Cerebrovascular disease. Myocardial infarction. 

 

SURGICAL HISTORY :      

None. 

 

ENCOUNTER:      

Initial

 

ACUITY:      

1 month

 

PAIN SCALE:      

3/10

 

LOCATION:         

spine

 

TECHNIQUE:     

Volumetric scanning of the thoracic spine was performed.  Multiplanar reconstructions in the sagittal
, coronal and oblique axial planes were performed.  Using automated exposure control and adjustment o
f the mA and/or kV according to patient size, radiation dose was kept as low as reasonably achievable
 to obtain optimal diagnostic quality images.   DICOM format image data is available electronically f
or review and comparison.  

 

FINDINGS:     

Sagittal and coronal reformats are provided. These demonstrate adequate alignment of the cervical mely
tebral bodies. No acute compression fracture is seen. No destructive lesion is identified.

 

Axial imaging through the disc spaces is provided. These demonstrate scattered, minimal degenerative 
changes. There is no significant neural foraminal stenosis or spinal stenosis.

 

The paraspinous soft tissues are unremarkable.

 

There are advanced COPD changes throughout the pulmonary parenchyma.

 

CONCLUSION:     

1. Mild degenerative changes of the thoracic spine. No acute abnormality.

2. COPD changes within the pulmonary parenchyma.

 

 

 

 Richi Bustamante MD on October 10, 2017 at 13:44           

Board Certified Radiologist.

 This report was verified electronically.

## 2017-11-12 ENCOUNTER — HOSPITAL ENCOUNTER (EMERGENCY)
Dept: HOSPITAL 17 - PHED | Age: 77
Discharge: HOME | End: 2017-11-12
Payer: MEDICARE

## 2017-11-12 VITALS — HEIGHT: 65 IN | WEIGHT: 132.28 LBS | BODY MASS INDEX: 22.04 KG/M2

## 2017-11-12 VITALS
RESPIRATION RATE: 16 BRPM | DIASTOLIC BLOOD PRESSURE: 67 MMHG | SYSTOLIC BLOOD PRESSURE: 140 MMHG | HEART RATE: 77 BPM | TEMPERATURE: 98.1 F | OXYGEN SATURATION: 97 %

## 2017-11-12 VITALS — OXYGEN SATURATION: 97 %

## 2017-11-12 DIAGNOSIS — I25.2: ICD-10-CM

## 2017-11-12 DIAGNOSIS — R53.1: ICD-10-CM

## 2017-11-12 DIAGNOSIS — F43.23: Primary | ICD-10-CM

## 2017-11-12 DIAGNOSIS — R05: ICD-10-CM

## 2017-11-12 LAB
ALP SERPL-CCNC: 138 U/L (ref 45–117)
ALT SERPL-CCNC: 27 U/L (ref 10–53)
ANION GAP SERPL CALC-SCNC: 9 MEQ/L (ref 5–15)
AST SERPL-CCNC: 19 U/L (ref 15–37)
BASOPHILS # BLD AUTO: 0 TH/MM3 (ref 0–0.2)
BASOPHILS NFR BLD: 0.4 % (ref 0–2)
BILIRUB SERPL-MCNC: 0.3 MG/DL (ref 0.2–1)
BUN SERPL-MCNC: 23 MG/DL (ref 7–18)
CHLORIDE SERPL-SCNC: 104 MEQ/L (ref 98–107)
COLOR UR: YELLOW
COMMENT (UR): (no result)
CULTURE IF INDICATED: (no result)
EOSINOPHIL # BLD: 0.1 TH/MM3 (ref 0–0.4)
EOSINOPHIL NFR BLD: 1 % (ref 0–4)
ERYTHROCYTE [DISTWIDTH] IN BLOOD BY AUTOMATED COUNT: 12.5 % (ref 11.6–17.2)
ETHANOL SERPL-MCNC: (no result) MG/DL (ref 0–5)
GFR SERPLBLD BASED ON 1.73 SQ M-ARVRAT: 54 ML/MIN (ref 89–?)
GLUCOSE UR STRIP-MCNC: (no result) MG/DL
HCO3 BLD-SCNC: 25.9 MEQ/L (ref 21–32)
HCT VFR BLD CALC: 39.7 % (ref 35–46)
HEMO FLAGS: (no result)
HGB UR QL STRIP: (no result)
INR PPP: 1 RATIO
KETONES UR STRIP-MCNC: (no result) MG/DL
LEUKOCYTE ESTERASE UR QL STRIP: (no result) /HPF (ref 0–5)
LYMPHOCYTES # BLD AUTO: 1.4 TH/MM3 (ref 1–4.8)
LYMPHOCYTES NFR BLD AUTO: 17.7 % (ref 9–44)
MCH RBC QN AUTO: 28.5 PG (ref 27–34)
MCHC RBC AUTO-ENTMCNC: 33.3 % (ref 32–36)
MCV RBC AUTO: 85.6 FL (ref 80–100)
METHOD OF COLLECTION: (no result)
MONOCYTES NFR BLD: 6.5 % (ref 0–8)
NEUTROPHILS # BLD AUTO: 5.9 TH/MM3 (ref 1.8–7.7)
NEUTROPHILS NFR BLD AUTO: 74.4 % (ref 16–70)
NITRITE UR QL STRIP: (no result)
PLATELET # BLD: 256 TH/MM3 (ref 150–450)
POTASSIUM SERPL-SCNC: 3.9 MEQ/L (ref 3.5–5.1)
PROTHROMBIN TIME: 10.5 SEC (ref 9.8–11.6)
RBC # BLD AUTO: 4.63 MIL/MM3 (ref 4–5.3)
RBC #/AREA URNS HPF: (no result) /HPF (ref 0–3)
SODIUM SERPL-SCNC: 139 MEQ/L (ref 136–145)
SP GR UR STRIP: 1.03 (ref 1–1.03)
SQUAMOUS #/AREA URNS HPF: (no result) /HPF (ref 0–5)
WBC # BLD AUTO: 7.9 TH/MM3 (ref 4–11)

## 2017-11-12 PROCEDURE — 84484 ASSAY OF TROPONIN QUANT: CPT

## 2017-11-12 PROCEDURE — 87040 BLOOD CULTURE FOR BACTERIA: CPT

## 2017-11-12 PROCEDURE — 81001 URINALYSIS AUTO W/SCOPE: CPT

## 2017-11-12 PROCEDURE — 84443 ASSAY THYROID STIM HORMONE: CPT

## 2017-11-12 PROCEDURE — 82550 ASSAY OF CK (CPK): CPT

## 2017-11-12 PROCEDURE — 70450 CT HEAD/BRAIN W/O DYE: CPT

## 2017-11-12 PROCEDURE — 82140 ASSAY OF AMMONIA: CPT

## 2017-11-12 PROCEDURE — 93005 ELECTROCARDIOGRAM TRACING: CPT

## 2017-11-12 PROCEDURE — 99285 EMERGENCY DEPT VISIT HI MDM: CPT

## 2017-11-12 PROCEDURE — 85610 PROTHROMBIN TIME: CPT

## 2017-11-12 PROCEDURE — 80307 DRUG TEST PRSMV CHEM ANLYZR: CPT

## 2017-11-12 PROCEDURE — 83605 ASSAY OF LACTIC ACID: CPT

## 2017-11-12 PROCEDURE — 80053 COMPREHEN METABOLIC PANEL: CPT

## 2017-11-12 PROCEDURE — 71010: CPT

## 2017-11-12 PROCEDURE — 85025 COMPLETE CBC W/AUTO DIFF WBC: CPT

## 2017-11-12 PROCEDURE — 96360 HYDRATION IV INFUSION INIT: CPT

## 2017-11-12 NOTE — RADRPT
EXAM DATE/TIME:  11/12/2017 12:11 

 

[facility] COMPARISON:     

CT BRAIN W/O CONTRAST, April 23, 2017, 15:17.

 

 

INDICATIONS :     

Hyperglycemic and shaky with altered mental status.

                      

 

RADIATION DOSE:     

58.84 CTDIvol (mGy) 

 

 

 

MEDICAL HISTORY :     

Cerebrovascular disease. Myocardial infarction. Hypertension.

 

SURGICAL HISTORY :      

Hysterectomy. 

 

ENCOUNTER:      

Initial

 

ACUITY:      

2 days

 

PAIN SCALE:      

0/10

 

LOCATION:        

cranial 

 

TECHNIQUE:     

Multiple contiguous axial images were obtained of the head.  Using automated exposure control and adj
ustment of the mA and/or kV according to patient size, radiation dose was kept as low as reasonably a
chievable to obtain optimal diagnostic quality images.   DICOM format image data is available electro
nically for review and comparison.  

 

FINDINGS:     

 

CEREBRUM:     

The ventricles are normal for age.  Minimal, stable periventricular small vessel ischemic demyelinati
on, predominantly around the frontal horns of the lateral ventricles. No evidence of midline shift, m
ass lesion, hemorrhage or acute infarction.  No extra-axial fluid collections are seen.

 

POSTERIOR FOSSA:     

The cerebellum and brainstem are intact.  The 4th ventricle is midline.  The cerebellopontine angle i
s unremarkable.

 

EXTRACRANIAL:     

The visualized portion of the orbits is intact.

 

SKULL:     

The calvaria is intact.  No evidence of skull fracture.

 

CONCLUSION:     

1. Minimal but stable periventricular small vessel ischemic demyelination, predominantly around the f
rontal horns of the lateral ventricles.

2. Nothing acute.

 

 

 

 Mg Flores MD on November 12, 2017 at 12:27           

Board Certified Radiologist.

 This report was verified electronically.

## 2017-11-12 NOTE — PD
HPI


Chief Complaint:  Diabetic


Time Seen by Provider:  11:01


Travel History


International Travel<30 days:  No


Contact w/Intl Traveler<30days:  No


Traveled to known affect area:  No





History of Present Illness


HPI


77-year-old female came to the emergency room with history of feeling shaky, 

blurred vision and feeling like she cannot think right this morning when she 

woke up.  Usually she feels like this when her sugar goes low.  She tried 

eating 6 grapes which made her feel little bit better but she was concerned and 

went to her neighbor's house who brought her to the emergency room.  Currently 

patient appears to be fairly anxious and also in somewhat disorganized thought.

  She appears to be disheveled.  She says that her sugar yesterday was 245.  

She does not have a primary care currently since the one she had close down.  

She does not take any diabetes medications.  Vital signs were relatively 

stable.  She has been trying to answer questions but once again and somewhat 

disorganized manner.  There is currently no family member in the room with her.

  She lives alone in her house.  Patient is also complaining of backache and 

muscle cramps.





PFSH


Past Medical History


*** Narrative Medical


List of her past medical, surgical, social and family history is reviewed from 

the nursing note.


Hx Anticoagulant Therapy:  No


Anxiety:  Yes


Cardiovascular Problems:  Yes (hx of MI)


High Cholesterol:  Yes


Cerebrovascular Accident:  Yes (CVA)


Diabetes:  Yes


Diminished Hearing:  Yes ([place]


Immunizations Current:  Yes


Myocardial Infarction:  Yes (x1 at age 36 ?)


Pregnant?:  Not Pregnant


Menopausal:  Yes


Dilation and Curettage (D&C):  Yes





Past Surgical History


Hysterectomy:  Yes (Partial 1980.)


Tonsillectomy:  Yes


Other Surgery:  Yes (ANAL FISSURE)





Social History


Alcohol Use:  No


Tobacco Use:  No


Substance Use:  No





Allergies-Medications


(Allergen,Severity, Reaction):  


Coded Allergies:  


     Sulfa (Sulfonamide Antibiotics) (Unverified  Allergy, Severe, WAS A CHILD 

WHEN HAPPENED AND NOT SURE WHAT HAPPENED, 11/21/17)


Comments


List of her allergies reviewed from the nursing note.


Reported Meds & Prescriptions





Reported Meds & Active Scripts


Active


Lidocaine Patch 12 HR (Lidocaine) 5 % Patch 1 Patch TOPICAL DAILY PRN


     Remove patch after 12 hours


Reported


Acetaminophen 325 Mg Capsule  .XX 


Ondansetron (Ondansetron HCl) 8 Mg Tab 4 Mg PO PRN





Narrative Medication


List of her home medications reviewed from the nursing note.





Review of Systems


Except as stated in HPI:  all other systems reviewed are Neg


Eyes:  Positive: Blurred Vision


Musculoskeletal:  Positive: Pain





Physical Exam


Narrative


GENERAL: Awake, alert, disheveled, anxious, disorganized thoughts


SKIN: Focused skin assessment warm/dry.


HEAD: Atraumatic. Normocephalic. 


EYES: Pupils equal and round. No scleral icterus.  Bilateral conjunctival 

injection


ENT: No nasal bleeding or discharge.  Mucous membranes pink and moist.


NECK: Trachea midline. No JVD. 


CARDIOVASCULAR: Regular rate and rhythm.  No murmur appreciated.


RESPIRATORY: No accessory muscle use. Clear to auscultation. Breath sounds 

equal bilaterally. 


GASTROINTESTINAL: Abdomen soft, non-tender, nondistended. Hepatic and splenic 

margins not palpable. 


MUSCULOSKELETAL: No obvious deformities. No clubbing.  No cyanosis.  No edema. 


NEUROLOGICAL: Awake and alert. No obvious cranial nerve deficits.  Motor 

grossly within normal limits. Normal speech.  Essential tremors


PSYCHIATRIC: Disorganized thought





Data


Data


Last Documented VS





Orders





 Orders


Electrocardiogram (11/12/17 11:12)


Ammonia (11/12/17 11:12)


Complete Blood Count With Diff (11/12/17 11:12)


Comprehensive Metabolic Panel (11/12/17 11:12)


Prothrombin Time / Inr (Pt) (11/12/17 11:12)


Troponin I (11/12/17 11:12)


Thyroid Stimulating Hormone (11/12/17 11:12)


Urinalysis - C+S If Indicated (11/12/17 11:12)


Lactic Acid Sepsis Protocol (11/12/17 11:12)


Blood Culture (11/12/17 11:12)


Chest, Single Ap (11/12/17 11:12)


Ct Brain W/O Iv Contrast(Rout) (11/12/17 11:12)


Blood Glucose (11/12/17 11:12)


Ecg Monitoring (11/12/17 11:12)


Iv Access Insert/Monitor (11/12/17 11:12)


Oximetry (11/12/17 11:12)


Sodium Chloride 0.9% Flush (Ns Flush) (11/12/17 11:15)


Drug Screen, Random Urine (11/12/17 11:12)


Alcohol (Ethanol) (11/12/17 11:12)


Sodium Chlorid 0.9% 500 Ml Inj (Ns 500 M (11/12/17 11:15)


Creatine Kinase (Cpk) (11/12/17 11:23)


Ed Discharge Order (11/12/17 13:41)





Labs





Laboratory Tests








Test


  11/12/17


11:36 11/12/17


11:37 11/12/17


11:54


 


Lactic Acid Level 0.7 mmol/L   


 


Ammonia 27 MCMOL/L   


 


White Blood Count  7.9 TH/MM3  


 


Red Blood Count  4.63 MIL/MM3  


 


Hemoglobin  13.2 GM/DL  


 


Hematocrit  39.7 %  


 


Mean Corpuscular Volume  85.6 FL  


 


Mean Corpuscular Hemoglobin  28.5 PG  


 


Mean Corpuscular Hemoglobin


Concent 


  33.3 % 


  


 


 


Red Cell Distribution Width  12.5 %  


 


Platelet Count  256 TH/MM3  


 


Mean Platelet Volume  7.2 FL  


 


Neutrophils (%) (Auto)  74.4 %  


 


Lymphocytes (%) (Auto)  17.7 %  


 


Monocytes (%) (Auto)  6.5 %  


 


Eosinophils (%) (Auto)  1.0 %  


 


Basophils (%) (Auto)  0.4 %  


 


Neutrophils # (Auto)  5.9 TH/MM3  


 


Lymphocytes # (Auto)  1.4 TH/MM3  


 


Monocytes # (Auto)  0.5 TH/MM3  


 


Eosinophils # (Auto)  0.1 TH/MM3  


 


Basophils # (Auto)  0.0 TH/MM3  


 


CBC Comment  DIFF FINAL  


 


Differential Comment    


 


Prothrombin Time  10.5 SEC  


 


Prothromb Time International


Ratio 


  1.0 RATIO 


  


 


 


Blood Urea Nitrogen  23 MG/DL  


 


Creatinine  0.99 MG/DL  


 


Random Glucose  119 MG/DL  


 


Total Protein  7.2 GM/DL  


 


Albumin  3.7 GM/DL  


 


Calcium Level  8.8 MG/DL  


 


Alkaline Phosphatase  138 U/L  


 


Aspartate Amino Transf


(AST/SGOT) 


  19 U/L 


  


 


 


Alanine Aminotransferase


(ALT/SGPT) 


  27 U/L 


  


 


 


Total Bilirubin  0.3 MG/DL  


 


Sodium Level  139 MEQ/L  


 


Potassium Level  3.9 MEQ/L  


 


Chloride Level  104 MEQ/L  


 


Carbon Dioxide Level  25.9 MEQ/L  


 


Anion Gap  9 MEQ/L  


 


Estimat Glomerular Filtration


Rate 


  54 ML/MIN 


  


 


 


Total Creatine Kinase  57 U/L  


 


Troponin I


  


  LESS THAN 0.02


NG/ML 


 


 


Thyroid Stimulating Hormone


3rd Gen 


  2.650 uIU/ML 


  


 


 


Ethyl Alcohol Level


  


  LESS THAN 3


MG/DL 


 


 


Urine Collection Type   CLEAN CATCH 


 


Urine Color   YELLOW 


 


Urine Turbidity   CLEAR 


 


Urine pH   5.5 


 


Urine Specific Gravity   1.026 


 


Urine Protein   NEG mg/dL 


 


Urine Glucose (UA)   NEG mg/dL 


 


Urine Ketones   NEG mg/dL 


 


Urine Occult Blood   NEG 


 


Urine Nitrite   NEG 


 


Urine Bilirubin   NEG 


 


Urine Leukocyte Esterase   SMALL 


 


Urine RBC   0-3 /hpf 


 


Urine WBC   3-5 /hpf 


 


Urine Squamous Epithelial


Cells 


  


  0-5 /hpf 


 


 


Microscopic Urinalysis Comment


  


  


  CULT NOT


INDICATED


 


Urine Collection Time   11:54 


 


Urine Opiates Screen   NEG 


 


Urine Barbiturates Screen   NEG 


 


Urine Amphetamines Screen   NEG 


 


Urine Benzodiazepines Screen   NEG 


 


Urine Cocaine Screen   NEG 


 


Urine Cannabinoids Screen   NEG 











MDM


Medical Decision Making


Medical Screen Exam Complete:  Yes


Emergency Medical Condition:  Yes


Medical Record Reviewed:  Yes


Interpretation(s)


Twelve-lead EKG was reviewed by me.  Normal sinus rhythm, left axis deviation, 

poor R-wave progression.  Heart rate of 71 bpm.


Differential Diagnosis


Metabolic encephalopathy, dehydration, UTI, stroke


Narrative Course


11:49 AM awaiting for the blood test results and the CAT scan to be done and 

resulted.  Patient is getting 500 cc of fluid bolus.  Her blood glucose was 131.





12:49 PM all the blood test results, CAT scan and x-ray reports are back.  They'

re all within acceptable limits.  I went and spoke with the patient and she 

says she has an appointment with a new provider on the 20th of this month.  

Meanwhile she also told me that she has 4 children but nobody would help her.  

Apparently one of them took her car away and right now she has no means to 

drive herself to the grocery store or go anywhere else.  I've asked the nurse 

to call family and find out if patient has any help at home.  Otherwise she may 

be an unsafe disposition.





1:37 PM please refer to the nurse's note regarding the discussion he had with 

patient's daughter Aniya.  As far as I understand the family has been trying to 

help her.  Patient has been calling and crying multiple times in a day that she 

is having a heart attack or some other medical condition.  Family has also 

tried to get home health care which patient has refused.  At this point family 

does not know how else to help.  Patient is adamant that her children have been 

very unhelpful with her situation.  It seems more off family dynamic issue at 

this point with patient being non-receptive to help.  There is some adjustment 

disorder with her living alone and poor coping mechanisms with that.  She would 

need to follow up with her primary care and hopefully some resolution can 

happen.  I'm comfortable discharging her home at this point.





Procedures


EKG Prior to Arrival:  No





Diagnosis





 Primary Impression:  


 Adjustment disorder


 Qualified Codes:  F43.23 - Adjustment disorder with mixed anxiety and 

depressed mood


 Additional Impressions:  


 Anxiety


 Ineffective coping


 possible dementia


Referrals:  


Primary Care Physician


1 week





***Additional Instructions:  


Please return to the ER if the condition worsens or any other new concerns.  

Follow-up with primary care.


***Med/Other Pt SpecificInfo:  No Change to Meds


Disposition:  01 DISCHARGE HOME


Condition:  Stable











Irene Noland MD Nov 12, 2017 11:04

## 2017-11-12 NOTE — RADRPT
EXAM DATE/TIME:  11/12/2017 11:22 

 

[facility] COMPARISON:     

CHEST SINGLE AP, October 10, 2017, 12:45.

 

                     

INDICATIONS :     

Weakness, cough

                     

 

MEDICAL HISTORY :     

Myocardial infarction.  Stroke.  Diabetes mellitus type II.      

 

SURGICAL HISTORY :     

None.   

 

ENCOUNTER:     

Initial                                        

 

ACUITY:     

1 day      

 

PAIN SCORE:     

0/10

 

LOCATION:     

Bilateral chest 

 

FINDINGS:     

A single view of the chest demonstrates the lungs to be symmetrically aerated without evidence of mas
s, infiltrate or effusion. Mild generalized interstitial prominence remains evident. The cardiomedias
tinal contours are unremarkable.  Osseous structures are intact.

 

CONCLUSION:     

1. Interstitial lung disease which appears chronic.

2. Otherwise stable chest without evidence of consolidating airspace disease, mass densities, effusio
ns or significant congestion.

 

 

 

 Maynor Jaramillo MD on November 12, 2017 at 11:47           

Board Certified Radiologist.

 This report was verified electronically.

## 2017-11-13 ENCOUNTER — HOSPITAL ENCOUNTER (EMERGENCY)
Dept: HOSPITAL 17 - PHED | Age: 77
Discharge: HOME | End: 2017-11-13
Payer: MEDICARE

## 2017-11-13 VITALS
TEMPERATURE: 98.1 F | OXYGEN SATURATION: 98 % | DIASTOLIC BLOOD PRESSURE: 62 MMHG | RESPIRATION RATE: 15 BRPM | SYSTOLIC BLOOD PRESSURE: 131 MMHG | HEART RATE: 73 BPM

## 2017-11-13 DIAGNOSIS — E11.9: ICD-10-CM

## 2017-11-13 DIAGNOSIS — G89.29: ICD-10-CM

## 2017-11-13 DIAGNOSIS — Z86.73: ICD-10-CM

## 2017-11-13 DIAGNOSIS — I10: ICD-10-CM

## 2017-11-13 DIAGNOSIS — M54.6: Primary | ICD-10-CM

## 2017-11-13 DIAGNOSIS — I25.2: ICD-10-CM

## 2017-11-13 DIAGNOSIS — R25.1: ICD-10-CM

## 2017-11-13 PROCEDURE — 99282 EMERGENCY DEPT VISIT SF MDM: CPT

## 2017-11-13 NOTE — PD
HPI


Chief Complaint:  GI Complaint


Time Seen by Provider:  09:20


Travel History


International Travel<30 days:  No


Contact w/Intl Traveler<30days:  No


Traveled to known affect area:  No





History of Present Illness


HPI


This 77-year-old female has complaint of midthoracic back pain and a tremor of 

the right arm.  She has been having the back pain for about the area and she 

was seen here in October and had a CT scan of the thoracic spine which was 

normal.  She also had a chest x-ray which showed some changes consistent with 

COPD.  She's also had some tremor of the right hand which has been fairly 

persistent.  She drinks 2 cups of coffee daily.  She does not take any 

medications.  She has some children that live in town.  They have tried to get 

her to go to assisted living but she has refused.





PFSH


Past Medical History


Hx Anticoagulant Therapy:  No


Anxiety:  Yes


Cardiovascular Problems:  Yes (hx of MI)


High Cholesterol:  Yes


Cerebrovascular Accident:  Yes (CVA)


Diabetes:  Yes


Patient Takes Glucophage:  No


Diminished Hearing:  Yes ([place])


Hypertension:  Yes


Immunizations Current:  Yes


Myocardial Infarction:  Yes (x1 at age 36 ?)


Menopausal:  Yes


Dilation and Curettage (D&C):  Yes





Past Surgical History


Hysterectomy:  Yes (Partial 1980.)


Tonsillectomy:  Yes


Other Surgery:  Yes (ANAL FISSURE)





Social History


Alcohol Use:  No


Tobacco Use:  No


Substance Use:  No





Allergies-Medications


(Allergen,Severity, Reaction):  


Coded Allergies:  


     Sulfa (Sulfonamide Antibiotics) (Unverified  Allergy, Severe, WAS A CHILD 

WHEN HAPPENED AND NOT SURE WHAT HAPPENED, 11/13/17)


Reported Meds & Prescriptions





Reported Meds & Active Scripts


Active


No Active Prescriptions or Reported Medications    








Review of Systems


General / Constitutional:  No: Fever, Chills


Eyes:  No: Diploplia, Blurred Vision


HENT:  No: Headaches, Neck Pain


Cardiovascular:  No: Chest Pain or Discomfort, Palpitations


Respiratory:  No: Cough


Gastrointestinal:  No: Vomiting, Diarrhea


Musculoskeletal:  Positive: Pain, No: Myalgias


Skin:  No Rash, No Itching


Neurologic:  Positive: Tremor


Hematologic/Lymphatic:  No: Easy Bruising





Physical Exam


Narrative


GENERAL: Well-developed female.


SKIN: Focused skin assessment warm/dry.


HEAD: Atraumatic. Normocephalic. 


EYES: Pupils equal and round. No scleral icterus. No injection or drainage. 


ENT: No nasal bleeding or discharge.  Mucous membranes pink and moist.


NECK: Trachea midline. No JVD. 


CARDIOVASCULAR: Regular rate and rhythm.  No murmur appreciated.


RESPIRATORY: No accessory muscle use. Clear to auscultation. Breath sounds 

equal bilaterally. 


GASTROINTESTINAL: Abdomen soft, non-tender, nondistended. Hepatic and splenic 

margins not palpable. 


MUSCULOSKELETAL: No obvious deformities. No clubbing.  No cyanosis.  No edema.  

Ears and tenderness of the midthoracic spine on the right


NEUROLOGICAL: Awake and alert. No obvious cranial nerve deficits.  Motor 

grossly within normal limits. Normal speech.  There is a fairly persistent 

tremor of the right hand


PSYCHIATRIC: Patient does appear quite anxious





Data


Data


Last Documented VS





Vital Signs








  Date Time  Temp Pulse Resp B/P (MAP) Pulse Ox O2 Delivery O2 Flow Rate FiO2


 


11/13/17 09:05 98.1 73 15 131/62 (85) 98   











MDM


Medical Decision Making


Medical Screen Exam Complete:  Yes


Emergency Medical Condition:  Yes


Medical Record Reviewed:  Yes


Differential Diagnosis


Differential includes nonspecific back pain, tremor, anxiety


Narrative Course


Patient has had a recent CT of the thoracic spine as well as a chest x-ray 

which did not reveal etiology for pain.  She appears to move well.  He does 

have tremor of the right arm which has been ongoing for at least 2 weeks.  I don

't think any intervention is warranted at this time.  She is stable for 

discharge I will recommend that she take Tylenol for her back pain





Diagnosis





 Primary Impression:  


 Back pain


 Qualified Codes:  M54.6 - Pain in thoracic spine; G89.29 - Other chronic pain


 Additional Impression:  


 Tremor





***Additional Instructions:  


Take Tylenol as needed for pain


Scripts


No Active Prescriptions or Reported Meds


Disposition:  01 DISCHARGE HOME


Condition:  Stable











Zafar Jose MD Nov 13, 2017 09:40

## 2017-11-13 NOTE — EKG
Date Performed: 11/12/2017       Time Performed: 11:28:49

 

PTAGE:      77 years

 

EKG:      Sinus rhythm 

 

 LEFT ANTERIOR FASCICULAR BLOCK MINIMAL VOLTAGE CRITERIA FOR LVH, CONSIDER NORMAL VARIANT POSSIBLE AN
TERIOR MYOCARDIAL INFARCTION ABNORMAL ECG Since 

 

 PREVIOUS TRACING            , no significant change noted PREVIOUS TRACING: 10/10/2017 12.04

 

DOCTOR:   Harshal Bingham  Interpretating Date/Time  11/13/2017 19:08:31

## 2017-11-14 ENCOUNTER — HOSPITAL ENCOUNTER (EMERGENCY)
Dept: HOSPITAL 17 - PHEFT | Age: 77
Discharge: HOME | End: 2017-11-14
Payer: MEDICARE

## 2017-11-14 VITALS — WEIGHT: 131.18 LBS | HEIGHT: 65 IN | BODY MASS INDEX: 21.85 KG/M2

## 2017-11-14 VITALS
DIASTOLIC BLOOD PRESSURE: 67 MMHG | SYSTOLIC BLOOD PRESSURE: 148 MMHG | HEART RATE: 75 BPM | OXYGEN SATURATION: 97 % | TEMPERATURE: 97.8 F | RESPIRATION RATE: 16 BRPM

## 2017-11-14 DIAGNOSIS — M54.89: Primary | ICD-10-CM

## 2017-11-14 PROCEDURE — 99282 EMERGENCY DEPT VISIT SF MDM: CPT

## 2017-11-14 NOTE — PD
HPI


Chief Complaint:  Pain: Acute or Chronic


Time Seen by Provider:  09:31


Travel History


International Travel<30 days:  No


Contact w/Intl Traveler<30days:  No


Traveled to known affect area:  No





History of Present Illness


HPI


This is a 77-year-old female who presents complaining of mid back pain.  This 

is been going on for the past few weeks.  She was seen here in October 10 and 

had a CT of the thoracic spine revealing no acute abnormalities.  The patient 

has been seen here for the past 3 days for evaluation.  She reports that her 

pain was worse this morning after doing some work around the house.  There is 

concern from previous notes that the patient is somewhat disheveled and 

disorganized in her thought process.  She lives alone but she has daughters 

that come and check on her.  The patient reports that her daughter and he is 

setting up home health care at her house starting in 4 days.  She also has a 

new primary care practitioner, Jessica Serna, whom she has her first appointment 

with on November 20.  She has no other complaints at this time.





PFSH


Past Medical History


Hx Anticoagulant Therapy:  No


Anxiety:  Yes


Cardiovascular Problems:  Yes (hx of MI)


High Cholesterol:  Yes


Cerebrovascular Accident:  Yes (CVA)


Diabetes:  Yes


Patient Takes Glucophage:  No


Diminished Hearing:  Yes ([place])


Hypertension:  Yes


Immunizations Current:  Yes


Myocardial Infarction:  Yes (x1 at age 36 ?)


Pregnant?:  Not Pregnant


Menopausal:  Yes


Dilation and Curettage (D&C):  Yes





Past Surgical History


Hysterectomy:  Yes (Partial 1980.)


Tonsillectomy:  Yes


Other Surgery:  Yes (ANAL FISSURE)





Social History


Alcohol Use:  No


Tobacco Use:  No


Substance Use:  No





Allergies-Medications


(Allergen,Severity, Reaction):  


Coded Allergies:  


     Sulfa (Sulfonamide Antibiotics) (Unverified  Allergy, Severe, WAS A CHILD 

WHEN HAPPENED AND NOT SURE WHAT HAPPENED, 11/14/17)


Reported Meds & Prescriptions





Reported Meds & Active Scripts


Active


Lidocaine Patch 12 HR (Lidocaine) 5 % Patch 1 Patch TOPICAL DAILY PRN


     Remove patch after 12 hours








Review of Systems


Except as stated in HPI:  all other systems reviewed are Neg





Physical Exam


Narrative


GENERAL: Well-developed well-nourished female who appears anxious.  She is 

alert and oriented to person, place, time.


SKIN: Warm and dry.


HEAD: Atraumatic. Normocephalic. 


EYES: Pupils equal and round. No scleral icterus. No injection or drainage. 


ENT: No nasal bleeding or discharge.  Mucous membranes pink and moist.


NECK: Trachea midline. No JVD. 


CARDIOVASCULAR: Regular rate and rhythm.  No murmur appreciated.


RESPIRATORY: No accessory muscle use. Clear to auscultation. Breath sounds 

equal bilaterally. 


GASTROINTESTINAL: Abdomen soft, non-tender, nondistended. Hepatic and splenic 

margins not palpable. 


MUSCULOSKELETAL: No obvious deformities.  There is no tenderness to palpation 

of the back or paravertebral musculature.  In fact, palpation reduces her pain.


NEUROLOGICAL: Awake and alert. No obvious cranial nerve deficits.  Motor 

grossly within normal limits. Normal speech.


PSYCHIATRIC: Affect is slightly flat.  Insight and judgment appear reasonable.





Data


Data


Last Documented VS





Vital Signs








  Date Time  Temp Pulse Resp B/P (MAP) Pulse Ox O2 Delivery O2 Flow Rate FiO2


 


11/14/17 08:57 97.8 75 16 148/67 (94) 97   











MDM


Medical Decision Making


Medical Screen Exam Complete:  Yes


Emergency Medical Condition:  Yes


Medical Record Reviewed:  Yes


Differential Diagnosis


Mechanical back pain, compression fracture, renal stone, AAA


Narrative Course


I reviewed the patient's recent workups over the past several days.  She has 

had numerous laboratory tests as well as a CT the brain, chest x-ray 2 days ago 

which were all unremarkable.  I don't think she would benefit from additional 

lab work or imaging studies at this time.  Her pain seems musculoskeletal.  She 

is somewhat disorganized in her thought pattern however she does not meet baker 

act criteria.  This patient would certainly benefit from home health care or 

eventual placement with his assisted-living facility.  She does report that she 

does have home health care organized to start coming to her house in 4 days.  

My attending, who evaluated the patient yesterday as well, agrees with plan of 

care.  At this point in time the plan will be to discharge the patient with a 

short course of Lidoderm patches and have her follow up with her primary care 

next week as scheduled.  She is stable for discharge.





Diagnosis





 Primary Impression:  


 Back pain





***Additional Instructions:  


Over-the-counter Tylenol as needed per dosing instructions on the bottle.  

Lidoderm patches.  Follow-up with primary care.  Return for any emergent 

medical conditions.


***Med/Other Pt SpecificInfo:  Prescription(s) given


Scripts


Lidocaine Patch 12 HR (Lidocaine Patch 12 HR) 5 % Patch


1 PATCH TOPICAL DAILY Y for PAIN, #1 BOX 1 Refill


   Remove patch after 12 hours


   Prov: Zafar Jose MD         11/14/17


Disposition:  01 DISCHARGE HOME


Condition:  Stable











Freedom Aponte Nov 14, 2017 09:38

## 2017-12-13 ENCOUNTER — HOSPITAL ENCOUNTER (EMERGENCY)
Dept: HOSPITAL 17 - NEPD | Age: 77
Discharge: HOME | End: 2017-12-13
Payer: MEDICARE

## 2017-12-13 VITALS
DIASTOLIC BLOOD PRESSURE: 81 MMHG | RESPIRATION RATE: 18 BRPM | HEART RATE: 70 BPM | OXYGEN SATURATION: 99 % | TEMPERATURE: 98 F | SYSTOLIC BLOOD PRESSURE: 182 MMHG

## 2017-12-13 VITALS — BODY MASS INDEX: 22.04 KG/M2 | HEIGHT: 65 IN | WEIGHT: 132.28 LBS

## 2017-12-13 DIAGNOSIS — I10: ICD-10-CM

## 2017-12-13 DIAGNOSIS — Z79.899: ICD-10-CM

## 2017-12-13 DIAGNOSIS — F41.9: Primary | ICD-10-CM

## 2017-12-13 DIAGNOSIS — I25.2: ICD-10-CM

## 2017-12-13 DIAGNOSIS — Z86.73: ICD-10-CM

## 2017-12-13 DIAGNOSIS — E11.9: ICD-10-CM

## 2017-12-13 DIAGNOSIS — Z88.2: ICD-10-CM

## 2017-12-13 DIAGNOSIS — E78.00: ICD-10-CM

## 2017-12-13 PROCEDURE — 99283 EMERGENCY DEPT VISIT LOW MDM: CPT

## 2017-12-13 NOTE — PD
HPI


Chief Complaint:  Anxiety


Time Seen by Provider:  15:39


Travel History


International Travel<30 days:  No


Contact w/Intl Traveler<30days:  No


Traveled to known affect area:  No





History of Present Illness


HPI


Patient is a 77 year old female who presents to the ER with complaints of 

anxiety reaction. Patient reports that her children live far away from her and 

sometimes they forget about her. Reports that one Jas, her children 

forgot to invite her over for the holidays. Patient reports that she is anxious 

and does not like to be alone.  Patient reports "I just wanted to see my 

children."  Patient reports that she called EMS as she was anxious and felt 

lonely. Reports that after her children were notified, patient felt relieved as 

her children are on the way to visit her.  Patient reports that she is not 

suicidal or homicidal.  Patient denies chest pain or shortness of breath.  

Patient does not want to be seen by a mental health screener.  She request to 

be discharged at this time as her children are on their way to see her.





PFSH


Past Medical History


Hx Anticoagulant Therapy:  No


Anxiety:  Yes


Cardiovascular Problems:  Yes (hx of MI)


High Cholesterol:  Yes


Cerebrovascular Accident:  Yes (CVA)


Diabetes:  Yes


Diminished Hearing:  Yes ([place])


Hypertension:  Yes


Immunizations Current:  Yes


Myocardial Infarction:  Yes (x1 at age 36 ?)


Menopausal:  Yes


Dilation and Curettage (D&C):  Yes





Past Surgical History


Hysterectomy:  Yes (Partial 1980.)


Tonsillectomy:  Yes


Other Surgery:  Yes (ANAL FISSURE)





Social History


Alcohol Use:  No


Tobacco Use:  No


Substance Use:  No





Allergies-Medications


(Allergen,Severity, Reaction):  


Coded Allergies:  


     Sulfa (Sulfonamide Antibiotics) (Unverified  Allergy, Severe, WAS A CHILD 

WHEN HAPPENED AND NOT SURE WHAT HAPPENED, 11/21/17)


Reported Meds & Prescriptions





Reported Meds & Active Scripts


Active


Lidocaine Patch 12 HR (Lidocaine) 5 % Patch 1 Patch TOPICAL DAILY PRN


     Remove patch after 12 hours


Reported


Acetaminophen 325 Mg Capsule  .XX 


Ondansetron (Ondansetron HCl) 8 Mg Tab 4 Mg PO PRN








Review of Systems


General / Constitutional:  No: Fever, Chills


Eyes:  No: Visual changes


HENT:  No: Headaches


Cardiovascular:  No: Chest Pain or Discomfort, Palpitations, Irregular Rhythm, 

Tachycardia


Respiratory:  No: Cough, Shortness of Breath


Gastrointestinal:  No: Abdominal Pain


Genitourinary:  No: Dysuria


Musculoskeletal:  No: Pain


Skin:  No Rash


Neurologic:  No: Weakness


Psychiatric:  Positive: Anxiety, No: Depression, Suicidal Ideations, Homicidal 

Ideation


Endocrine:  No: Polydipsia


Hematologic/Lymphatic:  No: Easy Bruising





Physical Exam


Narrative


GENERAL: NAD


SKIN: Focused skin assessment warm/dry.


HEAD: Atraumatic. Normocephalic. 


EYES: Pupils equal and round. No scleral icterus. No injection or drainage. 


ENT: No nasal bleeding or discharge.  Mucous membranes pink and moist.


NECK: Trachea midline. No JVD. 


CARDIOVASCULAR: Regular rate and rhythm.  No murmur appreciated.


RESPIRATORY: No accessory muscle use. Clear to auscultation. Breath sounds 

equal bilaterally. 


GASTROINTESTINAL: Abdomen soft, non-tender, nondistended. Hepatic and splenic 

margins not palpable. 


MUSCULOSKELETAL: No obvious deformities. No clubbing.  No cyanosis.  No edema. 


NEUROLOGICAL: Awake and alert. No obvious cranial nerve deficits.  Motor 

grossly within normal limits. Normal speech.


PSYCHIATRIC: Anxious mood and affect, Denies si/hi





Data


Data


Last Documented VS





Vital Signs








  Date Time  Temp Pulse Resp B/P (MAP) Pulse Ox O2 Delivery O2 Flow Rate FiO2


 


12/13/17 15:39  70 18     


 


12/13/17 15:35 98.0   182/81 (114) 99   











[facility]


Medical Decision Making


Medical Screen Exam Complete:  Yes


Emergency Medical Condition:  Yes


Medical Record Reviewed:  Yes


Interpretation(s)





Vital Signs








  Date Time  Temp Pulse Resp B/P (MAP) Pulse Ox O2 Delivery O2 Flow Rate FiO2


 


12/13/17 15:39  70 18     


 


12/13/17 15:35 98.0 70 18 182/81 (114) 99   








Differential Diagnosis


anxiety reaction


Narrative Course


Patient is a 77 year old female who presents to the ER with complaint of 

anxiety reaction.  Patient is anxious as her family tends to forget her and she 

feels alone as her children moved far away from her and sometimes they forget 

about her.  Reports that she felt lonely today so called EMS.  Patient at this 

time reports that she is feeling much better. Patient request to be discharged 

to home as she is feeling much better and her children are on the way to see 

her.  Patient with no complaints at this time. VSS. Patient denies si/hi. 

Patient safe to be discharged to home with outpatient followup





Diagnosis





 Primary Impression:  


 Anxiety


Patient Instructions:  General Instructions





***Additional Instructions:  





Please follow up with your primary care doctor in 2-3 days





Return to the ER if symptoms worsen or progress





Return to the ER as needed


Disposition:  01 DISCHARGE HOME


Condition:  Coreen Cody DO Dec 13, 2017 16:08

## 2017-12-14 ENCOUNTER — HOSPITAL ENCOUNTER (EMERGENCY)
Dept: HOSPITAL 17 - PHEFT | Age: 77
Discharge: HOME | End: 2017-12-14
Payer: MEDICARE

## 2017-12-14 ENCOUNTER — HOSPITAL ENCOUNTER (EMERGENCY)
Dept: HOSPITAL 17 - PHED | Age: 77
Discharge: HOME | End: 2017-12-14
Payer: COMMERCIAL

## 2017-12-14 VITALS
HEART RATE: 88 BPM | TEMPERATURE: 97.4 F | RESPIRATION RATE: 18 BRPM | SYSTOLIC BLOOD PRESSURE: 167 MMHG | DIASTOLIC BLOOD PRESSURE: 96 MMHG | OXYGEN SATURATION: 100 %

## 2017-12-14 VITALS
RESPIRATION RATE: 16 BRPM | HEART RATE: 73 BPM | SYSTOLIC BLOOD PRESSURE: 174 MMHG | TEMPERATURE: 97.9 F | DIASTOLIC BLOOD PRESSURE: 84 MMHG | OXYGEN SATURATION: 99 %

## 2017-12-14 VITALS — WEIGHT: 132.28 LBS | BODY MASS INDEX: 22.04 KG/M2 | HEIGHT: 65 IN

## 2017-12-14 VITALS — HEIGHT: 65 IN | BODY MASS INDEX: 21.67 KG/M2 | WEIGHT: 130.07 LBS

## 2017-12-14 DIAGNOSIS — M54.9: Primary | ICD-10-CM

## 2017-12-14 DIAGNOSIS — G89.29: ICD-10-CM

## 2017-12-14 DIAGNOSIS — M54.6: Primary | ICD-10-CM

## 2017-12-14 PROCEDURE — 96372 THER/PROPH/DIAG INJ SC/IM: CPT

## 2017-12-14 PROCEDURE — 99284 EMERGENCY DEPT VISIT MOD MDM: CPT

## 2017-12-14 PROCEDURE — 99282 EMERGENCY DEPT VISIT SF MDM: CPT

## 2017-12-14 NOTE — PD
HPI


Chief Complaint:  Back/ Neck Pain or Injury


Time Seen by Provider:  09:39


Travel History


International Travel<30 days:  No


Contact w/Intl Traveler<30days:  No


Traveled to known affect area:  No





History of Present Illness


HPI


Patient comes back to the emergency department complaining of continued 

thoracic back pain ongoing for at least 4 months.  Patient denies any fevers, 

trauma, change in bowel or bladder, chest pain, shortness of breath, neck pain, 

numbness or tingling anywhere, or weakness.  Patient reports she's been taking 

over-the-counter Tylenol for symptomatic relief but continues to have the pain.

  Patient states that she was given a prescription for some type of patch 

previously that seemed to help however she is unable to afford them.  Patient 

repeatedly talks about how she feels alone and that her family does not time 

for her because they're busy with work, but reports that she is visited by her 

son every weekend and talks to her family regularly.





PFSH


Past Medical History


Hx Anticoagulant Therapy:  No


Anxiety:  Yes


Cardiovascular Problems:  Yes (hx of MI)


High Cholesterol:  Yes


Cerebrovascular Accident:  Yes (CVA)


Diabetes:  Yes ([place])


Patient Takes Glucophage:  No


Diminished Hearing:  Yes ([place])


Hypertension:  Yes


Immunizations Current:  Yes


Myocardial Infarction:  Yes (x1 at age 36 ?)


Pregnant?:  Not Pregnant


Menopausal:  Yes


Dilation and Curettage (D&C):  Yes





Past Surgical History


Hysterectomy:  Yes (Partial 1980)


Tonsillectomy:  Yes


Other Surgery:  Yes (ANAL FISSURE)





Social History


Alcohol Use:  No


Tobacco Use:  No (NEVER)


Substance Use:  No





Allergies-Medications


(Allergen,Severity, Reaction):  


Coded Allergies:  


     Sulfa (Sulfonamide Antibiotics) (Unverified  Allergy, Severe, WAS A CHILD 

WHEN HAPPENED AND NOT SURE WHAT HAPPENED, 12/14/17)


Reported Meds & Prescriptions





Reported Meds & Active Scripts


Active


Reported


Acetaminophen 325 Mg Capsule 1 Tab PO Q3HR








Review of Systems


Except as stated in HPI:  all other systems reviewed are Neg





Physical Exam


Narrative


GENERAL: Well-developed, well nourished, in no acute distress, and non-ill 

appearing.


SKIN: Focused skin assessment warm and dry.


HEAD: Atraumatic. Normocephalic. 


EYES: Pupils equal and round. EOMI. No scleral icterus. No injection or 

drainage. 


ENT: No nasal bleeding or discharge.  Mucous membranes pink and moist.


NECK: Trachea midline. Supple.  No nuclear rigidity.


CARDIOVASCULAR: Regular rate and rhythm.  No murmur appreciated.  Radial pulses 

2+, intact, equal bilaterally.


RESPIRATORY: No accessory muscle use.  No respiratory distress. Clear to 

auscultation. Breath sounds equal bilaterally. 


MUSCULOSKELETAL: No obvious deformities. No clubbing.  No cyanosis.  No edema.  

Full range of motion.  No tenderness or crepitus or midline thoracic spine.  

Patient reports tenderness to palpation bilateral paravertebral thoracic spinal 

muscles.  Patient moving all extremities without difficulty.


NEUROLOGICAL: Awake and alert. No obvious cranial nerve deficits.  Motor 

grossly within normal limits. Normal speech.


PSYCHIATRIC: Appropriate mood and affect; insight and judgment normal.





Data


Data


Last Documented VS





Vital Signs








  Date Time  Temp Pulse Resp B/P (MAP) Pulse Ox O2 Delivery O2 Flow Rate FiO2


 


12/14/17 09:22 97.4 88 18 167/96 (119) 100   








Orders





 Orders


Ed Discharge Order (12/14/17 10:26)








[facility]


Medical Decision Making


Medical Screen Exam Complete:  Yes


Emergency Medical Condition:  No


Medical Record Reviewed:  Yes


Differential Diagnosis


Fracture, strain, musculoskeletal pain, chronic pain


Narrative Course


The patient presented complaining of back pain.  Patient has been seen here 

multiple times for the same and has had CT and x-rays previously.  There was no 

history of recent fall or trauma. There was no evidence to support 

genitourinary etiology. There is also no evidence to suggest vascular pathology 

such as AAA dissection. No fevers or other evidence to suspect infectious 

processes, abscess, osteomyelitis etc. The patients neurological exam is normal 

with normal motor and sensory. There is no saddle paresthesias reported and no 

bowel or bladder change. I suspect the pain is mechanical in nature. Clinical 

suspicion, plan of care and management was discussed with the patient. The 

patient was instructed to follow up with their health care provider. The 

patient was also instructed to return if the pain worsened, changed, or 

developed weakness or bowel or bladder trouble. The patient agreed with plan.





I discussed patient possibly moving to a independent living facility where she 

would have a chance to have more interaction making her feel not as alone.  

Patient states she does not want to do this because she would have to sell her 

house and she would have 0 money to live on at that point.





Patient in no obvious distress upon re-evaluation.  Discussed patient with Dr. Brizuela prior to discharge, who is in agreement with plan of care and 

disposition.  Spoke with case management who  has seen the patient the past and 

reports there is no additional services that can provide at this time as 

patient is unwilling to seek placement.  Any questions/concerns in reference to 

patient diagnosis/condition discussed and clarified prior to patient's 

discharge. Reinforced sheer importance of close follow up with patient's 

primary physician or primary care clinic. Instructed patient to return to ED 

immediately, if symptoms return/worsen. Patient showed understanding of above 

instructions.  Further instructions and recommendations were detailed in 

discharge paperwork.  Patient ambulated without difficulty out of ED at 

discharge.





Diagnosis





 Primary Impression:  


 Chronic back pain


 Qualified Codes:  M54.6 - Pain in thoracic spine; G89.29 - Other chronic pain


Referrals:  


Orthopaedic Surgeon





Pain Management





Primary Care Physician


Patient Instructions:  Back Pain (ED), General Instructions





***Additional Instructions:  


Follow-up with your primary care physician for possible physical therapy 

referral.  Follow with pain management and/or orthopedics next week if pain 

continues.  Continue using over-the-counter Tylenol for pain control.  Follow 

instructions on the packaging. Follow instructions on the packaging.  Return to 

the emergency department if symptoms get worse.


Disposition:  01 DISCHARGE HOME


Condition:  Stable











Dada Tubbs Dec 14, 2017 10:26

## 2017-12-17 ENCOUNTER — HOSPITAL ENCOUNTER (EMERGENCY)
Dept: HOSPITAL 17 - PHED | Age: 77
Discharge: HOME | End: 2017-12-17
Payer: MEDICARE

## 2017-12-17 VITALS
RESPIRATION RATE: 16 BRPM | TEMPERATURE: 97.8 F | OXYGEN SATURATION: 95 % | DIASTOLIC BLOOD PRESSURE: 117 MMHG | HEART RATE: 78 BPM | SYSTOLIC BLOOD PRESSURE: 141 MMHG

## 2017-12-17 VITALS — WEIGHT: 171.96 LBS | BODY MASS INDEX: 28.65 KG/M2 | HEIGHT: 65 IN

## 2017-12-17 DIAGNOSIS — M54.6: Primary | ICD-10-CM

## 2017-12-17 DIAGNOSIS — G89.29: ICD-10-CM

## 2017-12-17 PROCEDURE — 96372 THER/PROPH/DIAG INJ SC/IM: CPT

## 2017-12-17 PROCEDURE — 99284 EMERGENCY DEPT VISIT MOD MDM: CPT

## 2017-12-17 NOTE — PD
HPI


Chief Complaint:  Musculoskeletal Complaint


Time Seen by Provider:  10:39


Travel History


International Travel<30 days:  No


Contact w/Intl Traveler<30days:  No


Traveled to known affect area:  No





History of Present Illness


HPI


77-year-old female presents emergency department for evaluation of back pain 4 

months.  Patient was seen at our facility twice on Friday for the same 

complaint.  She denies any new symptoms, injuries, falls since being evaluated 

last.  She denies any exacerbating factors.  Patient is ambulatory in the ED.  

Patient has a shortness breath, chills, fever, chest pain, abdominal pain, 

nausea, vomiting, diarrhea.  Patient states the injection they gave her on 

Friday help significantly.  Patient states she was taken over-the-counter 

Tylenol and that was helping.  Patient denies any incontinence of urine or stool

, saddle numbness, IV drug use.





PFSH


Past Medical History


Hx Anticoagulant Therapy:  No


Anxiety:  Yes


Cardiovascular Problems:  Yes (hx of MI)


High Cholesterol:  Yes


Cerebrovascular Accident:  Yes (CVA)


Diabetes:  Yes ([place])


Patient Takes Glucophage:  No


Diminished Hearing:  Yes ([place])


Hypertension:  Yes


Immunizations Current:  Yes


Myocardial Infarction:  Yes (x1 at age 36 ?)


Pregnant?:  Not Pregnant


Menopausal:  Yes


Dilation and Curettage (D&C):  Yes





Past Surgical History


Hysterectomy:  Yes (Partial 1980)


Tonsillectomy:  Yes


Other Surgery:  Yes (ANAL FISSURE)





Social History


Alcohol Use:  No


Tobacco Use:  No (NEVER)


Substance Use:  No





Allergies-Medications


(Allergen,Severity, Reaction):  


Coded Allergies:  


     Sulfa (Sulfonamide Antibiotics) (Unverified  Allergy, Severe, WAS A CHILD 

WHEN HAPPENED AND NOT SURE WHAT HAPPENED, 12/17/17)


Reported Meds & Prescriptions





Reported Meds & Active Scripts


Active


Tramadol (Tramadol HCl) 50 Mg Tab 50 Mg PO Q6H PRN


Reported


Acetaminophen 325 Mg Capsule 1 Tab PO Q3HR








Review of Systems


Except as stated in HPI:  all other systems reviewed are Neg





Physical Exam


Narrative


GENERAL: Well-nourished, well-developed 77-year-old female patient in no acute 

distress.  Nontoxic appearing.


SKIN: Focused skin assessment warm/dry.


HEAD: Normocephalic.  Atraumatic.


EYES: No scleral icterus. No injection or drainage. 


NECK: Supple, trachea midline. No JVD or lymphadenopathy.


CARDIOVASCULAR: Regular rate and rhythm without murmurs, gallops, or rubs.  

Pedal pulses +2 bilaterally.


RESPIRATORY: Breath sounds equal bilaterally. No accessory muscle use.


GASTROINTESTINAL: Abdomen soft, non-tender, nondistended. 


MUSCULOSKELETAL: No cyanosis, or edema.  Bilateral lower extremities 

neurovascularly intact.


BACK: Right lateral thoracic back tenderness.  No midline tenderness.  No 

obvious deformity, ecchymosis, cyanosis, erythema, edema. No CVA tenderness.





Data


Data


Last Documented VS





Vital Signs








  Date Time  Temp Pulse Resp B/P (MAP) Pulse Ox O2 Delivery O2 Flow Rate FiO2


 


12/17/17 09:59 97.8 78 16 141/117 (125) 95   








Orders





 Orders


Ketorolac Inj (Toradol Inj) (12/17/17 11:00)


Ed Discharge Order (12/17/17 11:48)








[facility]


Medical Decision Making


Medical Screen Exam Complete:  Yes


Emergency Medical Condition:  Yes


Differential Diagnosis


Differential diagnoses include but not limited to chronic back pain, back strain

, musculoskeletal pain


Narrative Course


Patient denies any injuries, falls or trauma.  Patient has been evaluated for 

the same complaint at her facility multiple times with the last time being 2 

days ago.  Patient states the injection leave her on Friday significantly.  

Upon looking at her medical records it was noted to be 15 mg IM Toradol.  

Patient given 50 mg IM Toradol.  She reports complete relief of symptoms and 

asked to be discharged.  Patient instructed that she has to follow up with 

primary care regarding her chronic back pain.  Patient given short 5 tablet 

prescription of tramadol to hold her over until she can follow up with her 

primary care next week.  Patient given instructions to use ice and heating pads 

to the area to help relieve the pain.  Patient stable for discharge and 

discharged home at this time.





Diagnosis





 Primary Impression:  


 Back pain


 Qualified Codes:  M54.6 - Pain in thoracic spine; G89.29 - Other chronic pain


Referrals:  


Primary Care Physician


Patient Instructions:  Back Pain (ED), General Instructions





***Additional Instructions:  


Please return to emergency department if your symptoms return or worsen. 


Follow up with your primary care provider. 


Take medications as prescribed.


Scripts


Tramadol (Tramadol) 50 Mg Tab


50 MG PO Q6H Y for PAIN, #5 TAB 0 Refills


   Prov: Carmen Mitchell MD         12/17/17


Disposition:  01 DISCHARGE HOME


Condition:  Stable











Amy Knight Dec 17, 2017 11:27

## 2017-12-20 ENCOUNTER — HOSPITAL ENCOUNTER (EMERGENCY)
Dept: HOSPITAL 17 - PHEFT | Age: 77
Discharge: LEFT BEFORE DISCHARGE | End: 2017-12-20
Payer: MEDICARE

## 2017-12-20 VITALS
TEMPERATURE: 97.8 F | DIASTOLIC BLOOD PRESSURE: 81 MMHG | RESPIRATION RATE: 16 BRPM | SYSTOLIC BLOOD PRESSURE: 184 MMHG | OXYGEN SATURATION: 99 % | HEART RATE: 66 BPM

## 2017-12-20 VITALS — BODY MASS INDEX: 21.41 KG/M2 | HEIGHT: 65 IN | WEIGHT: 128.53 LBS

## 2017-12-20 DIAGNOSIS — Z53.21: Primary | ICD-10-CM

## 2017-12-20 PROCEDURE — 99281 EMR DPT VST MAYX REQ PHY/QHP: CPT

## 2017-12-22 ENCOUNTER — HOSPITAL ENCOUNTER (EMERGENCY)
Dept: HOSPITAL 17 - PHED | Age: 77
Discharge: HOME | End: 2017-12-22
Payer: MEDICARE

## 2017-12-22 VITALS
OXYGEN SATURATION: 97 % | TEMPERATURE: 98 F | DIASTOLIC BLOOD PRESSURE: 74 MMHG | RESPIRATION RATE: 18 BRPM | HEART RATE: 71 BPM | SYSTOLIC BLOOD PRESSURE: 148 MMHG

## 2017-12-22 VITALS — DIASTOLIC BLOOD PRESSURE: 78 MMHG | SYSTOLIC BLOOD PRESSURE: 122 MMHG

## 2017-12-22 VITALS — WEIGHT: 130.07 LBS | BODY MASS INDEX: 21.67 KG/M2 | HEIGHT: 65 IN

## 2017-12-22 DIAGNOSIS — F20.9: ICD-10-CM

## 2017-12-22 DIAGNOSIS — I25.2: ICD-10-CM

## 2017-12-22 DIAGNOSIS — Z86.73: ICD-10-CM

## 2017-12-22 DIAGNOSIS — N39.0: Primary | ICD-10-CM

## 2017-12-22 DIAGNOSIS — E78.00: ICD-10-CM

## 2017-12-22 DIAGNOSIS — F03.90: ICD-10-CM

## 2017-12-22 DIAGNOSIS — I10: ICD-10-CM

## 2017-12-22 DIAGNOSIS — F41.9: ICD-10-CM

## 2017-12-22 DIAGNOSIS — B96.89: ICD-10-CM

## 2017-12-22 LAB
ALBUMIN SERPL-MCNC: 3.6 GM/DL (ref 3.4–5)
ALP SERPL-CCNC: 131 U/L (ref 45–117)
ALT SERPL-CCNC: 21 U/L (ref 10–53)
AST SERPL-CCNC: 19 U/L (ref 15–37)
BACTERIA #/AREA URNS HPF: (no result) /HPF
BASOPHILS # BLD AUTO: 0 TH/MM3 (ref 0–0.2)
BASOPHILS NFR BLD: 0.4 % (ref 0–2)
BILIRUB SERPL-MCNC: 0.5 MG/DL (ref 0.2–1)
BUN SERPL-MCNC: 11 MG/DL (ref 7–18)
CALCIUM SERPL-MCNC: 8.7 MG/DL (ref 8.5–10.1)
CHLORIDE SERPL-SCNC: 102 MEQ/L (ref 98–107)
COLOR UR: YELLOW
CREAT SERPL-MCNC: 0.92 MG/DL (ref 0.5–1)
EOSINOPHIL # BLD: 0.1 TH/MM3 (ref 0–0.4)
EOSINOPHIL NFR BLD: 0.8 % (ref 0–4)
ERYTHROCYTE [DISTWIDTH] IN BLOOD BY AUTOMATED COUNT: 12.3 % (ref 11.6–17.2)
GFR SERPLBLD BASED ON 1.73 SQ M-ARVRAT: 59 ML/MIN (ref 89–?)
GLUCOSE SERPL-MCNC: 108 MG/DL (ref 74–106)
GLUCOSE UR STRIP-MCNC: (no result) MG/DL
HCO3 BLD-SCNC: 27.8 MEQ/L (ref 21–32)
HCT VFR BLD CALC: 37.1 % (ref 35–46)
HGB BLD-MCNC: 12.8 GM/DL (ref 11.6–15.3)
HGB UR QL STRIP: (no result)
KETONES UR STRIP-MCNC: (no result) MG/DL
LEUKOCYTE ESTERASE UR QL STRIP: (no result) /HPF (ref 0–5)
LIPASE: 262 U/L (ref 73–393)
LYMPHOCYTES # BLD AUTO: 1.6 TH/MM3 (ref 1–4.8)
LYMPHOCYTES NFR BLD AUTO: 17 % (ref 9–44)
MCH RBC QN AUTO: 29.4 PG (ref 27–34)
MCHC RBC AUTO-ENTMCNC: 34.6 % (ref 32–36)
MCV RBC AUTO: 85.1 FL (ref 80–100)
MONOCYTE #: 0.5 TH/MM3 (ref 0–0.9)
MONOCYTES NFR BLD: 5.5 % (ref 0–8)
NEUTROPHILS # BLD AUTO: 7.2 TH/MM3 (ref 1.8–7.7)
NEUTROPHILS NFR BLD AUTO: 76.3 % (ref 16–70)
NITRITE UR QL STRIP: (no result)
PLATELET # BLD: 224 TH/MM3 (ref 150–450)
PMV BLD AUTO: 7.1 FL (ref 7–11)
PROT SERPL-MCNC: 7 GM/DL (ref 6.4–8.2)
RBC # BLD AUTO: 4.36 MIL/MM3 (ref 4–5.3)
RBC #/AREA URNS HPF: (no result) /HPF (ref 0–3)
SODIUM SERPL-SCNC: 137 MEQ/L (ref 136–145)
SP GR UR STRIP: 1.01 (ref 1–1.03)
SQUAMOUS #/AREA URNS HPF: (no result) /HPF (ref 0–5)
URINE LEUKOCYTE ESTERASE: (no result)
WBC # BLD AUTO: 9.4 TH/MM3 (ref 4–11)
WHITE BLOOD CELL CLUMPS: (no result)

## 2017-12-22 PROCEDURE — 85025 COMPLETE CBC W/AUTO DIFF WBC: CPT

## 2017-12-22 PROCEDURE — 81001 URINALYSIS AUTO W/SCOPE: CPT

## 2017-12-22 PROCEDURE — 83690 ASSAY OF LIPASE: CPT

## 2017-12-22 PROCEDURE — 87086 URINE CULTURE/COLONY COUNT: CPT

## 2017-12-22 PROCEDURE — 80053 COMPREHEN METABOLIC PANEL: CPT

## 2017-12-22 PROCEDURE — 99284 EMERGENCY DEPT VISIT MOD MDM: CPT

## 2017-12-22 PROCEDURE — 96365 THER/PROPH/DIAG IV INF INIT: CPT

## 2017-12-24 ENCOUNTER — HOSPITAL ENCOUNTER (EMERGENCY)
Dept: HOSPITAL 17 - PHED | Age: 77
Discharge: HOME | End: 2017-12-24
Payer: MEDICARE

## 2017-12-24 VITALS — WEIGHT: 125.66 LBS | BODY MASS INDEX: 20.94 KG/M2 | HEIGHT: 65 IN

## 2017-12-24 VITALS
OXYGEN SATURATION: 98 % | DIASTOLIC BLOOD PRESSURE: 70 MMHG | SYSTOLIC BLOOD PRESSURE: 168 MMHG | TEMPERATURE: 97.6 F | HEART RATE: 78 BPM | RESPIRATION RATE: 16 BRPM

## 2017-12-24 DIAGNOSIS — M54.9: Primary | ICD-10-CM

## 2017-12-24 DIAGNOSIS — Z79.82: ICD-10-CM

## 2017-12-24 DIAGNOSIS — I10: ICD-10-CM

## 2017-12-24 DIAGNOSIS — E78.00: ICD-10-CM

## 2017-12-24 DIAGNOSIS — F03.90: ICD-10-CM

## 2017-12-24 DIAGNOSIS — Z86.73: ICD-10-CM

## 2017-12-24 DIAGNOSIS — F41.9: ICD-10-CM

## 2017-12-24 DIAGNOSIS — F20.9: ICD-10-CM

## 2017-12-24 DIAGNOSIS — I25.2: ICD-10-CM

## 2017-12-24 PROCEDURE — 99283 EMERGENCY DEPT VISIT LOW MDM: CPT

## 2017-12-24 NOTE — PD
HPI


Chief Complaint:  Oral / Dental Pain or Problem


Time Seen by Provider:  08:13


Travel History


International Travel<30 days:  No


Contact w/Intl Traveler<30days:  No


Traveled to known affect area:  No





History of Present Illness


HPI


Patient is a 77-year-old female who comes to the ED frequently complaining of 

back pain.  She says she woke up this morning and has been experiencing pain to 

the middle of her back.  She denies any injuries.  She denies any radiation of 

the pain.  She denies any difficulty urinating.  She denies numbness or 

tingling.  She has not taken anything for her pain.  She says she tried to use 

a heating pad, but this did not provide any relief.





PFSH


Past Medical History


Hx Anticoagulant Therapy:  No


Anxiety:  Yes


Cardiovascular Problems:  Yes (hx of MI)


High Cholesterol:  Yes


Cerebrovascular Accident:  Yes (CVA)


Diabetes:  No


Diminished Hearing:  Yes ([place])


Hypertension:  Yes


Psychiatric:  Yes (17: RECENT DX OF DEMENTIA, PSYCHOSIS, SCHIZOPHRENIA)


Immunizations Current:  Yes


Myocardial Infarction:  Yes (x1 at age 36 ?)


Pregnant?:  Not Pregnant


Menopausal:  Yes


:  4


Para:  4


Dilation and Curettage (D&C):  Yes





Past Surgical History


Hysterectomy:  Yes (Partial )


Tonsillectomy:  Yes


Other Surgery:  Yes (ANAL FISSURE)





Social History


Alcohol Use:  No


Tobacco Use:  No (NEVER)


Substance Use:  No





Allergies-Medications


(Allergen,Severity, Reaction):  


Coded Allergies:  


     Sulfa (Sulfonamide Antibiotics) (Verified  Allergy, Severe, WAS A CHILD 

WHEN HAPPENED AND NOT SURE WHAT HAPPENED, 17)


Reported Meds & Prescriptions





Reported Meds & Active Scripts


Active


Miralax Powder (Polyethylene Glycol 3350 Powder) 17 Gm Powd 17 Gm PO DAILY


     Mix and dissolve one measuring cap-ful (17 grams) in water or juice.


Reported


Tylenol Extra Strength (Acetaminophen) 500 Mg Tablet 1 Tab PO Q6HR


Duloxetine DR (Duloxetine HCl) 30 Mg Capdr 30 Mg PO DAILY








Review of Systems


General / Constitutional:  No: Fever, Chills


HENT:  No: Headaches, Lightheadedness


Cardiovascular:  No: Chest Pain or Discomfort


Respiratory:  No: Shortness of Breath


Gastrointestinal:  No: Nausea, Vomiting


Musculoskeletal:  Positive: Pain, No: Edema


Skin:  No Rash, No Change in Pigmentation


Neurologic:  No: Paresthesia, Sensory Disturbance





Physical Exam


Narrative


GENERAL: Awake and alert, no acute distress.


SKIN: Focused skin assessment warm/dry.


HEAD: Atraumatic. Normocephalic. 


EYES: Pupils equal and round. No scleral icterus.  Extraocular movements intact.


ENT: Mucous membranes pink and moist.


CARDIOVASCULAR: Regular rate and rhythm.  No murmur appreciated.


RESPIRATORY: No accessory muscle use. Clear to auscultation. Breath sounds 

equal bilaterally. 


MUSCULOSKELETAL: No obvious deformities. No clubbing.  No cyanosis.  No edema.  

No spinal tenderness on palpation.  Pain with movement.


NEUROLOGICAL: Awake and alert. No obvious cranial nerve deficits.  Motor 

grossly within normal limits. Normal speech.





Data


Data


Last Documented VS





Vital Signs








  Date Time  Temp Pulse Resp B/P (MAP) Pulse Ox O2 Delivery O2 Flow Rate FiO2


 


17 07:54 97.6 78 16 168/70 (102) 98   








Orders





 Orders


Acetaminophen (Tylenol) (17 08:30)


Cyclobenzaprine (Flexeril) (17 08:30)








MDM


Medical Decision Making


Medical Screen Exam Complete:  Yes


Emergency Medical Condition:  Yes


Medical Record Reviewed:  Yes


Differential Diagnosis


Muscle spasm versus muscular skeletal pain versus chronic pain


Narrative Course


Patient is a 77-year-old female comes in complaining of back pain.  She is IN 

here for this complaint.  Exam shows no acute abnormalities.  Patient given 

Tylenol and Flexeril.  Will be discharged home.  Advised to take Tylenol as 

needed at home.  Advised follow-up with a primary care doctor.





Diagnosis





 Primary Impression:  


 Back pain


 Qualified Codes:  M54.6 - Pain in thoracic spine; G89.29 - Other chronic pain


Patient Instructions:  Back Pain (ED), General Instructions





***Additional Instructions:  


Take Tylenol as needed for pain.  Follow-up with your primary doctor.  Return 

to the ED as needed for any worsening symptoms.


Disposition:  01 DISCHARGE HOME


Condition:  Stable











Amy Estrada MD Dec 24, 2017 08:47

## 2018-01-12 ENCOUNTER — HOSPITAL ENCOUNTER (EMERGENCY)
Dept: HOSPITAL 17 - PHED | Age: 78
Discharge: HOME | End: 2018-01-12
Payer: MEDICARE

## 2018-01-12 VITALS
HEART RATE: 66 BPM | SYSTOLIC BLOOD PRESSURE: 155 MMHG | RESPIRATION RATE: 16 BRPM | DIASTOLIC BLOOD PRESSURE: 68 MMHG | OXYGEN SATURATION: 97 %

## 2018-01-12 VITALS — SYSTOLIC BLOOD PRESSURE: 136 MMHG | DIASTOLIC BLOOD PRESSURE: 72 MMHG

## 2018-01-12 VITALS
SYSTOLIC BLOOD PRESSURE: 138 MMHG | HEART RATE: 75 BPM | OXYGEN SATURATION: 97 % | TEMPERATURE: 98.1 F | RESPIRATION RATE: 14 BRPM | DIASTOLIC BLOOD PRESSURE: 65 MMHG

## 2018-01-12 DIAGNOSIS — H91.90: ICD-10-CM

## 2018-01-12 DIAGNOSIS — Z86.59: ICD-10-CM

## 2018-01-12 DIAGNOSIS — F41.9: Primary | ICD-10-CM

## 2018-01-12 DIAGNOSIS — I10: ICD-10-CM

## 2018-01-12 DIAGNOSIS — E78.00: ICD-10-CM

## 2018-01-12 DIAGNOSIS — I25.2: ICD-10-CM

## 2018-01-12 PROCEDURE — 99282 EMERGENCY DEPT VISIT SF MDM: CPT

## 2018-01-12 NOTE — PD
HPI


Chief Complaint:  Anxiety


Time Seen by Provider:  15:50


Travel History


International Travel<30 days:  No


Contact w/Intl Traveler<30days:  No


Traveled to known affect area:  No





History of Present Illness


HPI


76 y/o female presents by ambulance for report that she was anxious.  Here 

patient denies specific complaints but states that she was supposed to go to an 

assisted living facility.  She states her daughter has more information.  I 

called her daughter who stated that her mom had called her because she was 

crawling around on the floor.  She states that she wasn't complaining about 

anything else specifically.  History is limited at this time.





PFSH


Past Medical History


Hx Anticoagulant Therapy:  No


Anxiety:  Yes


Cardiovascular Problems:  Yes (hx of MI)


High Cholesterol:  Yes


Cerebrovascular Accident:  Yes


Diabetes:  No


Diminished Hearing:  Yes ([place])


Hypertension:  Yes


Psychiatric:  Yes (17: RECENT DX OF DEMENTIA, PSYCHOSIS, SCHIZOPHRENIA)


Immunizations Current:  Yes


Myocardial Infarction:  Yes (x1 at age 36 ?)


Menopausal:  Yes


:  4


Para:  4


Dilation and Curettage (D&C):  Yes





Past Surgical History


Hysterectomy:  Yes (Partial 1980)


Tonsillectomy:  Yes


Other Surgery:  Yes (ANAL FISSURE)





Social History


Alcohol Use:  No


Tobacco Use:  No (NEVER)


Substance Use:  No





Allergies-Medications


(Allergen,Severity, Reaction):  


Coded Allergies:  


     Sulfa (Sulfonamide Antibiotics) (Verified  Allergy, Severe, WAS A CHILD 

WHEN HAPPENED AND NOT SURE WHAT HAPPENED, 18)


Reported Meds & Prescriptions





Reported Meds & Active Scripts


Active


Miralax Powder (Polyethylene Glycol 3350 Powder) 17 Gm Powd 17 Gm PO DAILY


     Mix and dissolve one measuring cap-ful (17 grams) in water or juice.


Reported


Tylenol Extra Strength (Acetaminophen) 500 Mg Tablet 1 Tab PO Q6HR


Duloxetine DR (Duloxetine HCl) 30 Mg Capdr 30 Mg PO DAILY








Review of Systems


ROS Limitations:  Poor Historian





Physical Exam


Exam Limitations:  Poor Historian


Narrative


GENERAL: Well-nourished, well-developed patient.


SKIN: Warm and dry.


HEAD: Normocephalic and atraumatic.


EYES: No injection or drainage. 


ENT: No nasal drainage noted. 


NECK: Supple, trachea midline.


CARDIOVASCULAR: Regular rate and rhythm 


RESPIRATORY: No increased effort. No accessory muscle use.


GASTROINTESTINAL: Abdomen soft, non-tender, nondistended. 


EXTREMITIES: No edema.


NEUROLOGICAL: Awake and alert. Motor and sensory grossly within normal limits. 

Normal speech.





Data


Data


Last Documented VS





Vital Signs








  Date Time  Temp Pulse Resp B/P (MAP) Pulse Ox O2 Delivery O2 Flow Rate FiO2


 


18 18:00  69 18 136/72 (93) 96   


 


18 17:10      Room Air  


 


18 15:22 98.1       








Orders





 Orders


Ed Discharge Order (18 17:11)








MDM


Medical Decision Making


Medical Screen Exam Complete:  Yes


Emergency Medical Condition:  Yes


Medical Record Reviewed:  Yes (past history confirm, multiple recent visits 

reviewed)


Differential Diagnosis


Anxiety, well check, depression


Narrative Course


Lengthy discussion with daughter over the phone on  and they will come 

and pick her up.  They state they will work on getting her to the assisted 

living facility on Monday.  She has no active complaints or suicidal ideation 

currently.





Daughter arrived at bedside.  She states she feels comfortable taking patient 

home and is working on setting her up with an gutierrez that they will now see on 

Monday.  Lengthy discussion with both about possible workup and not wanting any 

other testing or treatment here.  Case management also talked with patient who 

is talked with them multiple times and patient is happy to go to this area, 

Patient denies any new complaints and states that they are feeling better.  all 

questions answered. daugther knows that follow up is incumbent on them and to 

return to the emergency room immediately if new or worsening symptoms develop. 

daugther given strict return precautions, vitals reviewed and are normal, 

agrees to further workup as an outpatient.





Diagnosis





 Primary Impression:  


 Anxiety disorder


 Qualified Codes:  F41.9 - Anxiety disorder, unspecified


Patient Instructions:  General Instructions





***Additional Instructions:  


return as needed, follow with primary monday


***Med/Other Pt SpecificInfo:  No Change to Meds


Disposition:  01 DISCHARGE HOME


Condition:  Stable











Roro Mcpherson MD 2018 16:14

## 2021-07-01 NOTE — PD
Chiqui Jacobson, [facility]    Subjective/HPI:     Iva Magana is a 76 y.o. female is here to establish care. She has a PMHx of mitral regurgitation, HTN, HLD and GERD. Here for evaluation of mitral regurgitation. Had echo done for cardiac murmur, found to have mild-mod MR, previously mild. Also had been having complaints of fatigue, however these have resolved. She believes symptoms were due to allergy medication, as fatigue resolved once she stopped her Zyrtec. Denies complaints of chest pains, dizziness, orthopnea, PND or edema. Denies shortness of breath of exertion. Has occasional, brief palpitation symptoms about 1-2x/month that last a few seconds, not bothersome. She has lost 7 lbs by cutting out sodas and sugars. Is going to try and lose more.     Past Medical History:   Diagnosis Date    Arthritis     narrowing of spine    Environmental and seasonal allergies     GERD - Esophagitis - reflux 3/22/2011    Headache(784.0) 6/27/2011    Hypercholesterolemia 3/22/2011    Hypertension 3/22/2011    Other ill-defined conditions(799.89)     high cholesterol, vertigo, migraine    Ulcer        Past Surgical History:   Procedure Laterality Date    COLONOSCOPY N/A 2/28/2018    COLONOSCOPY performed by Desiree Vieira MD at \[facility]\"" ENDOSCOPY    HX BREAST BIOPSY Left 1980s    benign cyst    HX GI      hemmorhoidectomy    HX HYSTERECTOMY      HX OTHER SURGICAL      \"hemrrhoid surgery\"    ND BREAST SURGERY PROCEDURE UNLISTED         Family History   Problem Relation Age of Onset    Hypertension Mother     Hypertension Father        Social History     Socioeconomic History    Marital status:      Spouse name: Not on file    Number of children: Not on file    Years of education: Not on file    Highest education level: Not on file   Occupational History    Not on file   Tobacco Use    Smoking status: Never Smoker    Smokeless tobacco: Never Used   Substance and Sexual HPI


Chief Complaint:  Back/ Neck Pain or Injury


Time Seen by Provider:  15:27


Travel History


International Travel<30 days:  No


Contact w/Intl Traveler<30days:  No


Traveled to known affect area:  No





History of Present Illness


HPI


Patient comes back to the emergency department complaining of continued back 

pain.  Patient is here proximal 6 hours ago or less for the same.  Patient has 

been seen multiple times for this.  Patient reports pain has been going on for 

at least 4 months.  Patient states that she went home and took her Tylenol as 

well as took a walk however she continues to have the pain.  Patient denies any 

new symptoms.  Denies anything making the pain worse.





PFSH


Past Medical History


Hx Anticoagulant Therapy:  No


Anxiety:  Yes


Cardiovascular Problems:  Yes (hx of MI)


High Cholesterol:  Yes


Cerebrovascular Accident:  Yes (CVA)


Diabetes:  Yes (BORDERLINE)


Patient Takes Glucophage:  No


Diminished Hearing:  Yes ([place])


Hypertension:  Yes


Immunizations Current:  Yes


Myocardial Infarction:  Yes (x1 at age 36 ?)


Tetanus Vaccination:  Unknown


Pregnant?:  Not Pregnant


Menopausal:  Yes


Dilation and Curettage (D&C):  Yes





Past Surgical History


Hysterectomy:  Yes (Partial 1980)


Tonsillectomy:  Yes


Other Surgery:  Yes (ANAL FISSURE)





Social History


Alcohol Use:  No


Tobacco Use:  No (NEVER)


Substance Use:  No





Allergies-Medications


(Allergen,Severity, Reaction):  


Coded Allergies:  


     Sulfa (Sulfonamide Antibiotics) (Unverified  Allergy, Severe, WAS A CHILD 

WHEN HAPPENED AND NOT SURE WHAT HAPPENED, 12/14/17)


Reported Meds & Prescriptions





Reported Meds & Active Scripts


Active


Reported


Acetaminophen 325 Mg Capsule 1 Tab PO Q3HR








Review of Systems


General / Constitutional:  No: Fever


Eyes:  No: Visual changes


HENT:  No: Headaches


Cardiovascular:  No: Chest Pain or Discomfort


Respiratory:  No: Shortness of Breath


Gastrointestinal:  No: Abdominal Pain


Genitourinary:  No: Dysuria


Musculoskeletal:  Positive: Pain


Skin:  No Rash


Neurologic:  No: Weakness


Psychiatric:  No: Depression


Endocrine:  No: Polydipsia


Hematologic/Lymphatic:  No: Easy Bruising





Physical Exam


Narrative


GENERAL: Well-developed, well nourished, in no acute distress, and non-ill 

appearing.  Patient ambulating around the ER talking on her cell phone.


SKIN: Focused skin assessment warm and dry.


HEAD: Atraumatic. Normocephalic. 


EYES: Pupils equal and round. EOMI. No scleral icterus. No injection or 

drainage. 


ENT: No nasal bleeding or discharge.  Mucous membranes pink and moist.


NECK: Trachea midline.  Supple.  No nuclear rigidity.


CARDIOVASCULAR: Regular rate and rhythm.  No murmur appreciated.


RESPIRATORY: No accessory muscle use.  No respiratory distress. Clear to 

auscultation. Breath sounds equal bilaterally. 


MUSCULOSKELETAL: No obvious deformities. No clubbing.  No cyanosis.  No edema.  

Full range of motion.  No tenderness crepitus or midline.  Thoracic spine.  

Patient is bladder pain bilateral paravertebral spinal muscles of thoracic 

spine.


NEUROLOGICAL: Awake and alert. No obvious cranial nerve deficits.  Motor 

grossly within normal limits. Normal speech.


PSYCHIATRIC: Appropriate mood and affect; insight and judgment normal.





Data


Data


Last Documented VS





Vital Signs








  Date Time  Temp Pulse Resp B/P (MAP) Pulse Ox O2 Delivery O2 Flow Rate FiO2


 


12/14/17 15:09 97.9 73 16 174/84 (114) 99   








Orders





 Orders


Ketorolac Inj (Toradol Inj) (12/14/17 15:30)


Ed Discharge Order (12/14/17 16:00)








[facility]


Medical Decision Making


Medical Screen Exam Complete:  Yes


Emergency Medical Condition:  No


Medical Record Reviewed:  Yes


Differential Diagnosis


Malingering, chronic pain, musculoskeletal pain, fracture, strain


Narrative Course


The patient presented complaining of chronic back pain. There was no history of 

recent fall or trauma. There was no evidence to support genitourinary etiology. 

There is also no evidence to suggest vascular pathology such as AAA dissection. 

No fevers or other evidence to suspect infectious processes, abscess, 

osteomyelitis etc. The patients neurological exam is normal with normal motor 

and sensory. There is no saddle paresthesias reported and no bowel or bladder 

incontinence or retention. I suspect the pain is mechanical in nature. Clinical 

suspicion, plan of care and management was discussed with the patient. The 

patient was instructed to follow up with their health care provider. The 

patient was also instructed to return if the pain worsened, changed, or 

developed weakness or bowel or bladder trouble. The patient agreed with plan.





Patient in no obvious distress upon re-evaluation.  Discussed patient with Dr. Brizuela prior to discharge, and is in agreement with plan of care and 

disposition.  Spoke with case management, who contacted patient's family as 

well as DCF.  Any questions/concerns in reference to patient diagnosis/

condition discussed and clarified prior to patient's discharge. Reinforced 

sheer importance of close follow up with patient's primary physician or primary 

care clinic. Instructed patient to return to ED immediately, if symptoms return/

worsen. Patient showed understanding of above instructions.  Further 

instructions and recommendations were detailed in discharge paperwork.  Patient 

ambulated without difficulty out of ED at discharge.





Diagnosis





 Primary Impression:  


 Chronic back pain greater than 3 months duration


Referrals:  


Orthopedist





Pain Management





Primary Care Physician


Patient Instructions:  Chronic Back Pain (ED), General Instructions





***Additional Instructions:  


Follow-up with your primary care physician in one to 3 days for reevaluation.  

Return to the emergency department if symptoms get worse.


Disposition:  01 DISCHARGE HOME


Condition:  Stable











Dada Tubbs Dec 14, 2017 16:00 Activity    Alcohol use: No    Drug use: No    Sexual activity: Not on file   Other Topics Concern    Not on file   Social History Narrative    Not on file     Social Determinants of Health     Financial Resource Strain:     Difficulty of Paying Living Expenses:    Food Insecurity:     Worried About Running Out of Food in the Last Year:     [address] in the Last Year:    Transportation Needs:     Lack of Transportation (Medical):  Lack of Transportation (Non-Medical):    Physical Activity:     Days of Exercise per Week:     Minutes of Exercise per Session:    Stress:     Feeling of Stress :    Social Connections:     Frequency of Communication with Friends and Family:     Frequency of Social Gatherings with Friends and Family:     Attends [employer] Services:     Active Member of Clubs or Organizations:     Attends Club or Organization Meetings:     Marital Status:    Intimate Partner Violence:     Fear of Current or Ex-Partner:     Emotionally Abused:     Physically Abused:     Sexually Abused:        No Known Allergies    Current Outpatient Medications on File Prior to Visit   Medication Sig Dispense Refill    acetaminophen (Tylenol Arthritis Pain) 650 mg TbER Take 650 mg by mouth every eight (8) hours.  estradioL (ESTRACE) 0.5 mg tablet Take 0.5 mg by mouth daily.  timolol (TIMOPTIC) 0.5 % ophthalmic solution Administer 1 Drop to both eyes two (2) times a day.  simvastatin (ZOCOR) 40 mg tablet TAKE 1 TABLET BY MOUTH DAILY 90 Tab 0    hydroCHLOROthiazide (HYDRODIURIL) 25 mg tablet TAKE 1 TABLET BY MOUTH EVERY DAY 90 Tab 0    losartan (COZAAR) 100 mg tablet TAKE 1 TABLET BY MOUTH DAILY 90 Tab 0    pantoprazole (PROTONIX) 40 mg tablet TAKE 1 TABLET BY MOUTH ONCE DAILY.  180 Tab 1    simvastatin (ZOCOR) 40 mg tablet TAKE 1 TABLET BY MOUTH DAILY 90 Tab 0    albuterol (PROAIR HFA) 90 mcg/actuation inhaler Take 1 Puff by inhalation every four (4) hours as needed for Wheezing or Shortness of Breath. 1 Inhaler 1    fluticasone (FLONASE ALLERGY RELIEF) 50 mcg/actuation nasal spray 2 Sprays by Both Nostrils route daily.  polyethylene glycol (MIRALAX) 17 gram packet Take 17 g by mouth daily.  cyclobenzaprine (FLEXERIL) 5 mg tablet Take 1 Tab by mouth nightly as needed for Muscle Spasm(s). (Patient not taking: Reported on 7/1/2021) 30 Tab 0    triamcinolone acetonide (KENALOG) 0.1 % topical cream Apply  to affected area two (2) times a day. use thin layer (Patient not taking: Reported on 7/1/2021) 30 g 0    furosemide (LASIX) 20 mg tablet Take 1 Tab by mouth daily. (Patient not taking: Reported on 7/1/2021) 14 Tab 0     No current facility-administered medications on file prior to visit. Lab Results   Component Value Date/Time    Cholesterol, total 158 06/15/2021 09:58 AM    HDL Cholesterol 68 06/15/2021 09:58 AM    LDL, calculated 74.8 06/15/2021 09:58 AM    VLDL, calculated 15.2 06/15/2021 09:58 AM    Triglyceride 76 06/15/2021 09:58 AM    CHOL/HDL Ratio 2.3 06/15/2021 09:58 AM     No results found for: CPK, CPKX, CPX  Lab Results   Component Value Date/Time    Sodium 138 06/15/2021 09:58 AM    Potassium 4.0 06/15/2021 09:58 AM    Chloride 104 06/15/2021 09:58 AM    CO2 27 06/15/2021 09:58 AM    Anion gap 7 06/15/2021 09:58 AM    Glucose 89 06/15/2021 09:58 AM    BUN 17 06/15/2021 09:58 AM    Creatinine 0.83 06/15/2021 09:58 AM    BUN/Creatinine ratio 20 06/15/2021 09:58 AM    GFR est AA >60 06/15/2021 09:58 AM    GFR est non-AA >60 06/15/2021 09:58 AM    Calcium 9.6 06/15/2021 09:58 AM    Bilirubin, total 0.5 06/15/2021 09:58 AM    Alk. phosphatase 72 06/15/2021 09:58 AM    Protein, total 7.3 06/15/2021 09:58 AM    Albumin 4.5 06/15/2021 09:58 AM    Globulin 2.8 06/15/2021 09:58 AM    A-G Ratio 1.6 06/15/2021 09:58 AM    ALT (SGPT) 17 06/15/2021 09:58 AM       Review of Symptoms:    Review of Systems   Constitutional: Negative for chills, fever and weight loss. HENT: Negative for nosebleeds. Eyes: Negative for blurred vision and double vision. Respiratory: Negative for cough, shortness of breath and wheezing. Cardiovascular: Negative for chest pain, palpitations, orthopnea, leg swelling and PND. Skin: Negative for rash. Neurological: Negative for dizziness and loss of consciousness. Physical Exam:      General: Well developed, in no acute distress, cooperative and alert  Heart:  reg rate and rhythm; normal S1/S2; no murmurs, no gallops or rubs. Respiratory: Clear bilaterally x 4, no wheezing or rales  Extremities:  Normal cap refill, no cyanosis, atraumatic. No edema. Vascular: 2+ pulses symmetric in all extremities    Vitals:    07/01/21 1021   BP: 128/80   BP 1 Location: Left arm   BP Patient Position: Sitting   BP Cuff Size: Adult   Pulse: 67   Resp: 18   Height: 5' 6\" (1.676 m)   Weight: 189 lb 3.2 oz (85.8 kg)   SpO2: 100%       ECG done today sinus rhythm     Assessment:       ICD-10-CM ICD-9-CM    1. Nonrheumatic mitral valve regurgitation  I34.0 424.0    2. Pure hypercholesterolemia  E78.00 272.0 AMB POC EKG ROUTINE W/ 12 LEADS, INTER & REP   3. Essential hypertension with goal blood pressure less than 130/85  I10 401.9 AMB POC EKG ROUTINE W/ 12 LEADS, INTER & REP        Plan:     1. Nonrheumatic mitral valve regurgitation  Echo done 6/2021 with preserved LVEF 60-65%, with mild-mod MR  Previous echo in 11/2017 with mild MR, EF 55-60%  Discussed progression  Continue afterload reduction for now    2. Pure hypercholesterolemia  LDL 74 in 6/2021  Continue statin therapy and low fat, low cholesterol diet  Lipids managed by PCP    3. Essential hypertension with goal blood pressure less than 130/85  BP controlled. Continue anti-hypertensive therapy and low sodium diet     Counseled on diet and exercise- eventual goal of 30-60 minutes 5-7 times a week as per AHA guidelines. F/u with Dr. Asif Hickey in 1 year with echo before.     Patient seen and examined by me with the above nurse practitioner. I personally performed all components of the history, physical, and medical decision making and agree with the assessment and plan with minor modifications as noted. Today the patient presents with slight progression of mitral regurgitation, stable blood pressure, and hyperlipidemia. General PE  Gen:  NAD  Mental Status - Alert. General Appearance - Not in acute distress. HEENT:  PERRL, no carotid bruits or JVD  Chest and Lung Exam   Inspection: Accessory muscles - No use of accessory muscles in breathing. Auscultation:   Breath sounds: - Normal.   Cardiovascular   Inspection: Jugular vein - Bilateral - Inspection Normal.   Palpation/Percussion:   Apical Impulse: - Normal.   Auscultation: Rhythm - Regular. Heart Sounds - S1 WNL and S2 WNL. No S3 or S4. Murmurs & Other Heart Sounds: Auscultation of the heart reveals -2 out of 6 murmur of mitral regurgitation  Peripheral Vascular   Upper Extremity: Inspection - Bilateral - No Cyanotic nailbeds or Digital clubbing. Lower Extremity:   Palpation: Edema - Bilateral - No edema. Abdomen:   Soft, non-tender, bowel sounds are active. Neuro: A&O times 3, CN and motor grossly WNL    Continue current care for hypertension and hyperlipidemia, repeat echo prior to 1 year follow-up for slight progression of mitral regurgitation.

## 2023-12-22 NOTE — RADHPO
EXAM DATE/TIME:  05/06/2017 17:56 

 

[facility] COMPARISON:     

CHEST SINGLE AP, July 05, 2016, 23:15.

 

                     

INDICATIONS :     

Patient states she woke up with shortness of breath today.  

                     

 

MEDICAL HISTORY :     

None.          

 

SURGICAL HISTORY :     

None.   

 

ENCOUNTER:     

Initial                                        

 

ACUITY:     

1 day      

 

PAIN SCORE:     

0/10

 

LOCATION:     

Bilateral  

 

FINDINGS:     

There is interstitial lung disease predominantly at the lung bases and lung periphery in a pattern ch
aracteristic of pulmonary fibrosis. No effusion. Heart size upper limits normal. Tortuous aorta.

 

CONCLUSION:     

1. Pulmonary fibrosis in a pattern that could be characteristic of usual interstitial pneumonitis. Fi
ndings are stable compared with July 2016. No new infiltrate or effusion.

 

 

 

 Slim Cedillo MD on May 06, 2017 at 19:03           

Board Certified Radiologist.

 This report was verified electronically. Goal Outcome Evaluation:  Plan of Care Reviewed With: patient        Progress: improving  Outcome Evaluation: VSS, alert and oriented x4, no complaints of pain during the night, pt did ambulate to the bathroom. No issues to report.